# Patient Record
Sex: MALE | Race: WHITE | Employment: FULL TIME | URBAN - METROPOLITAN AREA
[De-identification: names, ages, dates, MRNs, and addresses within clinical notes are randomized per-mention and may not be internally consistent; named-entity substitution may affect disease eponyms.]

---

## 2017-01-17 RX ORDER — PRAVASTATIN SODIUM 20 MG/1
TABLET ORAL
Qty: 30 TAB | Refills: 0 | Status: SHIPPED | OUTPATIENT
Start: 2017-01-17 | End: 2018-02-26 | Stop reason: SDUPTHER

## 2017-01-17 RX ORDER — MOMETASONE FUROATE 1 MG/G
CREAM TOPICAL
Qty: 30 G | Refills: 9 | Status: SHIPPED | OUTPATIENT
Start: 2017-01-17 | End: 2018-02-26 | Stop reason: SDUPTHER

## 2018-02-02 RX ORDER — MOMETASONE FUROATE 1 MG/G
CREAM TOPICAL
Refills: 8 | OUTPATIENT
Start: 2018-02-02

## 2018-02-26 ENCOUNTER — OFFICE VISIT (OUTPATIENT)
Dept: FAMILY MEDICINE CLINIC | Age: 53
End: 2018-02-26

## 2018-02-26 VITALS
HEIGHT: 66 IN | DIASTOLIC BLOOD PRESSURE: 75 MMHG | RESPIRATION RATE: 18 BRPM | SYSTOLIC BLOOD PRESSURE: 119 MMHG | BODY MASS INDEX: 30.02 KG/M2 | TEMPERATURE: 99 F | WEIGHT: 186.8 LBS | HEART RATE: 99 BPM | OXYGEN SATURATION: 96 %

## 2018-02-26 DIAGNOSIS — E78.5 HYPERLIPIDEMIA LDL GOAL <130: ICD-10-CM

## 2018-02-26 DIAGNOSIS — L30.9 ECZEMA OF FACE: Primary | ICD-10-CM

## 2018-02-26 DIAGNOSIS — Z11.59 NEED FOR HEPATITIS C SCREENING TEST: ICD-10-CM

## 2018-02-26 DIAGNOSIS — E55.9 VITAMIN D INSUFFICIENCY: ICD-10-CM

## 2018-02-26 RX ORDER — MOMETASONE FUROATE 1 MG/G
CREAM TOPICAL
Qty: 30 G | Refills: 9 | Status: SHIPPED | OUTPATIENT
Start: 2018-02-26 | End: 2019-04-04 | Stop reason: SDUPTHER

## 2018-02-26 NOTE — PROGRESS NOTES
HISTORY OF PRESENT ILLNESS  HPI  Katelyn Espinosa is a 46 y.o. male with history of vitamin D deficiency and hyperlipidemia who presents to office today for non-fasting follow-up. He denies unusual SOB, chest pain, and any recent ER visits or hospitalizations. Pt requests a refill of his Elocon cream, which he states he uses daily for eczema, mainly for patches on his left arm and face. Pt states he has stopped taking pravastatin, as he doesn't believe it's affected his cholesterol numbers that much. He denies any problems with the pravastatin. He notes a family history of hyperlipidemia with his mother. He denies any family history of stent; he notes a family history of heart attack with his paternal grandmother in her 76s. He states his maternal grandmother lived to 80, and his mother is 80; his paternal grandfather  early of liver cirrhosis. Pt notes intermittent painless left eye spasms. Past Medical History:   Diagnosis Date    Eczema of face 2014    Other and unspecified hyperlipidemia 2010    Other and unspecified hyperlipidemia 2010     Past Surgical History:   Procedure Laterality Date    HX COLONOSCOPY  14    normal-repeat in 2 years d/t findings    HX HEMORRHOIDECTOMY  2014    Dr Marianne Bland VASECTOMY  2006     Current Outpatient Prescriptions on File Prior to Visit   Medication Sig Dispense Refill    aspirin delayed-release 81 mg tablet Take 81 mg by mouth daily. No current facility-administered medications on file prior to visit.       No Known Allergies  Family History   Problem Relation Age of Onset    Cancer Mother     Elevated Lipids Mother     Cancer Father     Heart Disease Maternal Grandmother     Stroke Paternal Grandmother      Social History     Social History    Marital status:      Spouse name: N/A    Number of children: N/A    Years of education: N/A     Social History Main Topics    Smoking status: Never Smoker    Smokeless tobacco: Never Used    Alcohol use Yes      Comment: 16    Drug use: No    Sexual activity: Yes     Partners: Female     Other Topics Concern     Service No    Blood Transfusions No    Caffeine Concern Yes     24 oz. mountain dew daily    Occupational Exposure No    Hobby Hazards No    Sleep Concern No    Stress Concern No    Weight Concern No    Special Diet No    Back Care Yes    Exercise Yes    Bike Helmet No    Seat Belt Yes    Self-Exams Yes     Social History Narrative               Review of Systems   Constitutional: Negative for chills, diaphoresis, fever, malaise/fatigue and weight loss. Eyes: Negative for blurred vision, double vision, pain and redness. Respiratory: Negative for cough, shortness of breath and wheezing. Cardiovascular: Negative for chest pain, palpitations, orthopnea, claudication, leg swelling and PND. Skin: Negative for itching and rash.        scattered rough patches of erythema   Neurological: Negative for dizziness, tingling, tremors, sensory change, speech change, focal weakness, seizures, loss of consciousness, weakness and headaches. Results for orders placed or performed in visit on 84/02/62   METABOLIC PANEL, COMPREHENSIVE   Result Value Ref Range    Glucose 102 (H) 65 - 99 mg/dL    BUN 18 6 - 24 mg/dL    Creatinine 1.16 0.76 - 1.27 mg/dL    GFR est non-AA 74 >59 mL/min/1.73    GFR est AA 85 >59 mL/min/1.73    BUN/Creatinine ratio 16 9 - 20    Sodium 140 134 - 144 mmol/L    Potassium 4.0 3.5 - 5.2 mmol/L    Chloride 99 97 - 108 mmol/L    CO2 23 18 - 29 mmol/L    Calcium 9.5 8.7 - 10.2 mg/dL    Protein, total 6.6 6.0 - 8.5 g/dL    Albumin 4.3 3.5 - 5.5 g/dL    GLOBULIN, TOTAL 2.3 1.5 - 4.5 g/dL    A-G Ratio 1.9 1.1 - 2.5    Bilirubin, total 2.3 (H) 0.0 - 1.2 mg/dL    Alk.  phosphatase 46 39 - 117 IU/L    AST (SGOT) 20 0 - 40 IU/L    ALT (SGPT) 28 0 - 44 IU/L   LIPID PANEL   Result Value Ref Range    Cholesterol, total 205 (H) 100 - 199 mg/dL    Triglyceride 442 (H) 0 - 149 mg/dL    HDL Cholesterol 28 (L) >39 mg/dL    VLDL, calculated Comment 5 - 40 mg/dL    LDL, calculated Comment 0 - 99 mg/dL   CVD REPORT   Result Value Ref Range    INTERPRETATION Note                Physical Exam  Visit Vitals    /75 (BP 1 Location: Left arm, BP Patient Position: Sitting)    Pulse 99    Temp 99 °F (37.2 °C) (Oral)    Resp 18    Ht 5' 6\" (1.676 m)    Wt 186 lb 12.8 oz (84.7 kg)    SpO2 96%    BMI 30.15 kg/m2     Head: Normocephalic, without obvious abnormality, atraumatic  Eyes: conjunctivae/corneas clear. PERRL, EOM's intact. Neck: supple, symmetrical, trachea midline, no adenopathy, thyroid: not enlarged, symmetric, no tenderness/mass/nodules, no carotid bruit and no JVD  Lungs: clear to auscultation bilaterally  Heart: regular rate and rhythm, S1, S2 normal, no murmur, click, rub or gallop  Extremities: extremities normal, atraumatic, no cyanosis or edema  Pulses: 2+ and symmetric  Lymph nodes: Cervical, supraclavicular, and axillary nodes normal.  Neurologic: Grossly normal  Skin: he has scattered patches of erythematous skin that are mostly rough like fine sandpaper measuring anywhere from 1 cm to a couple cm, the ones that bother him the most are on either side of the face in the temple area, the area below his eyes especially the right eye, and on his left forearm he has about a 2 cm patch of skin that's erythematous            ASSESSMENT and PLAN    ICD-10-CM ICD-9-CM    1. Eczema of face L30.9 692.9 mometasone (ELOCON) 0.1 % topical cream   2. Vitamin D insufficiency-26 Jan 2013 E55.9 268.9 VITAMIN D, 25 HYDROXY   3. Hyperlipidemia LDL goal <130 F02.4 430.9 METABOLIC PANEL, COMPREHENSIVE      LIPID PANEL   4. Need for hepatitis C screening test Z11.59 V73.89 HEPATITIS C AB     Diagnoses and all orders for this visit:    1.  Eczema of face  -     mometasone (ELOCON) 0.1 % topical cream; APPLY TO AFFECTED AREA DAILY    2. Vitamin D insufficiency-26 Jan 2013  -     VITAMIN D, 25 HYDROXY    3. Hyperlipidemia LDL goal <660  -     METABOLIC PANEL, COMPREHENSIVE  -     LIPID PANEL    4. Need for hepatitis C screening test  -     HEPATITIS C AB      Follow-up Disposition:  Return in about 6 months (around 8/26/2018), or exertional SOB or CP, for F/U cholesterol, f/u Vitamin D deficiency, obesity. lab results and schedule of future lab studies reviewed with patient  reviewed diet, exercise and weight control  cardiovascular risk and specific lipid/LDL goals reviewed  reviewed medications and side effects in detail  Please call my office if there are any questions- [phone]. I will arrange for follow up after the lab tests done from today return  Recommended a weekly \"heart check. \" I went into detail how to do this. Call for refills on medications as needed. Discussed expected course/resolution/complications of diagnosis in detail with patient. Medication risks/benefits/costs/interactions/alternatives discussed with patient. Pt was given an after visit summary which includes diagnoses, current medications & vitals. Pt expressed understanding with the diagnosis and plan. Total 25 minutes,60 % counseling re: Reviewed symptoms, or lack thereof, of hypertension and elevated cholesterol. For a good Vit D level; recheck yearly and continue same Vit D intake lifelong. BMI is significantly elevated- in the obese range. I reviewed diet, exercise and weight control. Discussed weight control in detail, the importance of mainly decreased carbs, and for weight maintenance, exercise; discussed different diets and that it isn't as important to watch the type of foods as it is to decrease calorie intake no matter what type of diet you do, etc.      He'll come back another day and do his lab work. I reviewed his lab work that he's had done in the past, and I encouraged him to come in and do the fasting lab work to recheck his cholesterol.  He's also due a physical and prostate exam as well, so he should set that up in the next 6 months. I talked about the cardiac risk calculation, and we'll put his numbers in the formula and let him know his risk; most likely he won't need medication. Also, discussed symptoms of concern that were noted today in the note above, treatment options( including doing nothing), when to follow up before recommended time frame. Also, answered all questions. This document was written by Kingsley Paulino, as dictated by Den Rehman MD.  I have reviewed and agree with the above note and have made corrections where appropriate Erasmo Longoria M.D.

## 2018-02-26 NOTE — MR AVS SNAPSHOT
[address] [facility] [address] 
 
 
 [address] [place] [address] 
[phone] Patient: Naomi Baptiste MRN: [id number] STB:[birth year] Visit Information Date & Time Provider Department Dept. Phone Encounter #  
 2/26/2018  4:00 PM Angelica Nunez  [facility] [phone] [id number] Your Appointments 8/27/2018  4:00 PM  
ROUTINE CARE with Angelica Nunez MD  
[facility]) Appt Note: 6 month follow up  
 [address] [address]  
[phone]  
  
   
 [address] [address] Upcoming Health Maintenance Date Due Hepatitis C Screening 1965 DTaP/Tdap/Td series (1 - Tdap) 4/25/1986 COLONOSCOPY 6/13/2026 Allergies as of 2/26/2018  Review Complete On: 2/26/2018 By: Syed Carson LPN No Known Allergies Current Immunizations  Never Reviewed Name Date Hepatitis A Vaccine 6/15/2004 Influenza Vaccine 10/1/2013  
 dT Vaccine 6/15/2004 Not reviewed this visit You Were Diagnosed With   
  
 Codes Comments Vitamin D insufficiency    -  Primary ICD-10-CM: E55.9 ICD-9-CM: 268.9 Hyperlipidemia LDL goal <130     ICD-10-CM: E78.5 ICD-9-CM: 272.4 Need for hepatitis C screening test     ICD-10-CM: Z11.59 
ICD-9-CM: V73.89 Vitals BP Pulse Temp Resp Height(growth percentile) Weight(growth percentile) 119/75 (BP 1 Location: Left arm, BP Patient Position: Sitting) 99 99 °F (37.2 °C) (Oral) 18 5' 6\" (1.676 m) 186 lb 12.8 oz (84.7 kg) SpO2 BMI Smoking Status 96% 30.15 kg/m2 Never Smoker Vitals History BMI and BSA Data Body Mass Index Body Surface Area  
 30.15 kg/m 2 1.99 m 2 Preferred Pharmacy Pharmacy Name Phone [facility] [address] [facility] [phone] Your Updated Medication List  
  
   
 This list is accurate as of 2/26/18  5:12 PM.  Always use your most recent med list.  
  
  
  
  
 aspirin delayed-release 81 mg tablet Take 81 mg by mouth daily. mometasone 0.1 % topical cream  
Commonly known as:  ELOCON  
APPLY TO AFFECTED AREA DAILY pravastatin 20 mg tablet Commonly known as:  PRAVACHOL Take 1 Tab by mouth daily. Prescriptions Sent to Pharmacy Refills  
 mometasone (ELOCON) 0.1 % topical cream 9 Sig: APPLY TO AFFECTED AREA DAILY Class: Normal  
 Pharmacy: [facility] [address] [facility] #: [phone] We Performed the Following HEPATITIS C AB [33852 CPT(R)] LIPID PANEL [09167 CPT(R)] METABOLIC PANEL, COMPREHENSIVE [28141 CPT(R)] VITAMIN D, 25 HYDROXY [id number] CPT(R)] Introducing [facility]! Dear Negar Medina: 
Thank you for requesting a CLIPPATE account. Our records indicate that you already have an active CLIPPATE account. You can access your account anytime at [email or web address] Did you know that you can access your hospital and ER discharge instructions at any time in CLIPPATE? You can also review all of your test results from your hospital stay or ER visit. Additional Information If you have questions, please visit the Frequently Asked Questions section of the CLIPPATE website at [email or web address] Remember, CLIPPATE is NOT to be used for urgent needs. For medical emergencies, dial 911. Now available from your iPhone and Android! Please provide this summary of care documentation to your next provider. Your primary care clinician is listed as [address] [address], [facility] . If you have any questions after today's visit, please call [phone].

## 2018-02-26 NOTE — PROGRESS NOTES
Chief Complaint   Patient presents with    Cholesterol Problem    Vitamin D Deficiency     1. Have you been to the ER, urgent care clinic since your last visit? Hospitalized since your last visit? No    2. Have you seen or consulted any other health care providers outside of the [address] [place] [address] since your last visit? Include any pap smears or colon screening.  No    Patient states that he had a tdap within the past 10 years

## 2018-02-27 PROBLEM — E78.5 HYPERLIPIDEMIA LDL GOAL <130: Status: ACTIVE | Noted: 2018-02-27

## 2018-03-02 LAB
25(OH)D3+25(OH)D2 SERPL-MCNC: 21.5 NG/ML (ref 30–100)
ALBUMIN SERPL-MCNC: 4.5 G/DL (ref 3.5–5.5)
ALBUMIN/GLOB SERPL: 1.7 {RATIO} (ref 1.2–2.2)
ALP SERPL-CCNC: 54 IU/L (ref 39–117)
ALT SERPL-CCNC: 23 IU/L (ref 0–44)
AST SERPL-CCNC: 17 IU/L (ref 0–40)
BILIRUB SERPL-MCNC: 1.7 MG/DL (ref 0–1.2)
BUN SERPL-MCNC: 22 MG/DL (ref 6–24)
BUN/CREAT SERPL: 18 (ref 9–20)
CALCIUM SERPL-MCNC: 9.1 MG/DL (ref 8.7–10.2)
CHLORIDE SERPL-SCNC: 103 MMOL/L (ref 96–106)
CHOLEST SERPL-MCNC: 224 MG/DL (ref 100–199)
CO2 SERPL-SCNC: 22 MMOL/L (ref 18–29)
CREAT SERPL-MCNC: 1.2 MG/DL (ref 0.76–1.27)
GFR SERPLBLD CREATININE-BSD FMLA CKD-EPI: 69 ML/MIN/1.73
GFR SERPLBLD CREATININE-BSD FMLA CKD-EPI: 80 ML/MIN/1.73
GLOBULIN SER CALC-MCNC: 2.6 G/DL (ref 1.5–4.5)
GLUCOSE SERPL-MCNC: 112 MG/DL (ref 65–99)
HCV AB S/CO SERPL IA: <0.1 S/CO RATIO (ref 0–0.9)
HDLC SERPL-MCNC: 28 MG/DL
INTERPRETATION, 910389: NORMAL
LDLC SERPL CALC-MCNC: 121 MG/DL (ref 0–99)
POTASSIUM SERPL-SCNC: 4.5 MMOL/L (ref 3.5–5.2)
PROT SERPL-MCNC: 7.1 G/DL (ref 6–8.5)
SODIUM SERPL-SCNC: 141 MMOL/L (ref 134–144)
TRIGL SERPL-MCNC: 377 MG/DL (ref 0–149)
VLDLC SERPL CALC-MCNC: 75 MG/DL (ref 5–40)

## 2018-08-27 ENCOUNTER — OFFICE VISIT (OUTPATIENT)
Dept: FAMILY MEDICINE CLINIC | Age: 53
End: 2018-08-27

## 2018-08-27 VITALS
WEIGHT: 178 LBS | SYSTOLIC BLOOD PRESSURE: 100 MMHG | OXYGEN SATURATION: 98 % | DIASTOLIC BLOOD PRESSURE: 70 MMHG | HEART RATE: 68 BPM | BODY MASS INDEX: 28.61 KG/M2 | TEMPERATURE: 97.2 F | RESPIRATION RATE: 16 BRPM | HEIGHT: 66 IN

## 2018-08-27 DIAGNOSIS — E55.9 VITAMIN D INSUFFICIENCY: ICD-10-CM

## 2018-08-27 DIAGNOSIS — I10 HYPERTENSION GOAL BP (BLOOD PRESSURE) < 130/80: ICD-10-CM

## 2018-08-27 DIAGNOSIS — I21.02 ST ELEVATION MYOCARDIAL INFARCTION INVOLVING LEFT ANTERIOR DESCENDING (LAD) CORONARY ARTERY (HCC): ICD-10-CM

## 2018-08-27 DIAGNOSIS — E78.5 HYPERLIPIDEMIA LDL GOAL <130: Primary | ICD-10-CM

## 2018-08-27 PROBLEM — I21.9 HEART ATTACK (HCC): Status: ACTIVE | Noted: 2018-08-27

## 2018-08-27 RX ORDER — CARVEDILOL 3.12 MG/1
TABLET ORAL 2 TIMES DAILY WITH MEALS
COMMUNITY

## 2018-08-27 RX ORDER — LISINOPRIL 2.5 MG/1
TABLET ORAL 2 TIMES DAILY
COMMUNITY

## 2018-08-27 RX ORDER — ATORVASTATIN CALCIUM 20 MG/1
TABLET, FILM COATED ORAL DAILY
COMMUNITY

## 2018-08-27 RX ORDER — PRASUGREL 10 MG/1
TABLET, FILM COATED ORAL DAILY
COMMUNITY

## 2018-08-27 NOTE — PROGRESS NOTES
HISTORY OF PRESENT ILLNESS  HPI  Owen Franks is a 48 y.o. Male with a history of MI, eczema, vitamin D deficiency and hyperlipidemia with LDL goal <130, who presents at the office today for a follow up. Pt went to [facility] on 4/21 for MI, where she was seen by Dr. Audra Oro, a cardiologist. Pt had blockage of the LAD and required a stent. Pt states that he felt discomfort of his chest and thought that it might have been due to spoiled food, but notes that he had no improvement from BM or vomiting. He was seen at [facility] and states that since surgery he had not had any issues. Pt's LDL is 36, HDL is 32 and his total cholesterol level is 101. Pt's triglyceride level is 163. Pt has been told by Dr. Audra Oro that his heart is almost entirely back to normal with no long term damage, and he has not been restricted from any activity. Past Medical History:   Diagnosis Date    CAD (coronary artery disease)     MI on 4/21/18    Eczema of face 2/21/2014    Other and unspecified hyperlipidemia 2/19/2010    Other and unspecified hyperlipidemia 2/19/2010     Past Surgical History:   Procedure Laterality Date    HX COLONOSCOPY  5/8/14    normal-repeat in 2 years d/t findings    HX HEMORRHOIDECTOMY  4/2014    Dr Gorge Carbajal VASECTOMY  2006     Current Outpatient Prescriptions on File Prior to Visit   Medication Sig Dispense Refill    mometasone (ELOCON) 0.1 % topical cream APPLY TO AFFECTED AREA DAILY 30 g 9    aspirin delayed-release 81 mg tablet Take 81 mg by mouth daily. No current facility-administered medications on file prior to visit.       No Known Allergies  Family History   Problem Relation Age of Onset    Cancer Mother     Elevated Lipids Mother     Cancer Father 76     Liver    Heart Disease Maternal Grandmother 80    Suicide Maternal Grandfather 80    Stroke Paternal Grandmother 79    Liver Disease Paternal Grandfather     Alcohol abuse Paternal Grandfather 29     Social History Social History    Marital status:      Spouse name: N/A    Number of children: N/A    Years of education: N/A     Social History Main Topics    Smoking status: Never Smoker    Smokeless tobacco: Never Used    Alcohol use Yes      Comment: 16    Drug use: No    Sexual activity: Yes     Partners: Female     Other Topics Concern     Service No    Blood Transfusions No    Caffeine Concern Yes     24 oz. mountain dew daily    Occupational Exposure No    Hobby Hazards No    Sleep Concern No    Stress Concern No    Weight Concern No    Special Diet No    Back Care Yes    Exercise Yes    Bike Helmet No    Seat Belt Yes    Self-Exams Yes     Social History Narrative             Review of Systems   Constitutional: Negative for chills, diaphoresis, fever, malaise/fatigue and weight loss. Eyes: Negative for blurred vision, double vision, pain and redness. Respiratory: Negative for cough, shortness of breath and wheezing. Cardiovascular: Negative for chest pain, palpitations, orthopnea, claudication, leg swelling and PND. Skin: Negative for itching and rash. Neurological: Negative for dizziness, tingling, tremors, sensory change, speech change, focal weakness, seizures, loss of consciousness, weakness and headaches. Results for orders placed or performed in visit on 33/81/89   METABOLIC PANEL, COMPREHENSIVE   Result Value Ref Range    Glucose 112 (H) 65 - 99 mg/dL    BUN 22 6 - 24 mg/dL    Creatinine 1.20 0.76 - 1.27 mg/dL    GFR est non-AA 69 >59 mL/min/1.73    GFR est AA 80 >59 mL/min/1.73    BUN/Creatinine ratio 18 9 - 20    Sodium 141 134 - 144 mmol/L    Potassium 4.5 3.5 - 5.2 mmol/L    Chloride 103 96 - 106 mmol/L    CO2 22 18 - 29 mmol/L    Calcium 9.1 8.7 - 10.2 mg/dL    Protein, total 7.1 6.0 - 8.5 g/dL    Albumin 4.5 3.5 - 5.5 g/dL    GLOBULIN, TOTAL 2.6 1.5 - 4.5 g/dL    A-G Ratio 1.7 1.2 - 2.2    Bilirubin, total 1.7 (H) 0.0 - 1.2 mg/dL    Alk.  phosphatase 54 39 - 117 IU/L    AST (SGOT) 17 0 - 40 IU/L    ALT (SGPT) 23 0 - 44 IU/L   VITAMIN D, 25 HYDROXY   Result Value Ref Range    VITAMIN D, 25-HYDROXY 21.5 (L) 30.0 - 100.0 ng/mL   LIPID PANEL   Result Value Ref Range    Cholesterol, total 224 (H) 100 - 199 mg/dL    Triglyceride 377 (H) 0 - 149 mg/dL    HDL Cholesterol 28 (L) >39 mg/dL    VLDL, calculated 75 (H) 5 - 40 mg/dL    LDL, calculated 121 (H) 0 - 99 mg/dL   HEPATITIS C AB   Result Value Ref Range    Hep C Virus Ab <0.1 0.0 - 0.9 s/co ratio   CVD REPORT   Result Value Ref Range    INTERPRETATION Note          Physical Exam  Visit Vitals    /70 (BP 1 Location: Left arm, BP Patient Position: Sitting)    Pulse 68    Temp 97.2 °F (36.2 °C) (Oral)    Resp 16    Ht 5' 6\" (1.676 m)    Wt 178 lb (80.7 kg)    SpO2 98%    BMI 28.73 kg/m2     Head: Normocephalic, without obvious abnormality, atraumatic  Eyes: conjunctivae/corneas clear. PERRL, EOM's intact. Neck: supple, symmetrical, trachea midline, no adenopathy, thyroid: not enlarged, symmetric, no tenderness/mass/nodules, no carotid bruit and no JVD  Lungs: clear to auscultation bilaterally  Heart: regular rate and rhythm, S1, S2 normal, no murmur, click, rub or gallop  Extremities: extremities normal, atraumatic, no cyanosis or edema  Pulses: 2+ and symmetric  Lymph nodes: Cervical, supraclavicular, and axillary nodes normal.  Neurologic: Grossly normal      ASSESSMENT and PLAN    ICD-10-CM ICD-9-CM    1. Hyperlipidemia LDL goal <130 E78.5 272.4    2. Vitamin D insufficiency-26 Jan 2013 E55.9 268.9 VITAMIN D, 25 HYDROXY   3. Hypertension goal BP (blood pressure) < 130/80 L42 379.8 METABOLIC PANEL, COMPREHENSIVE     Diagnoses and all orders for this visit:    1. Hyperlipidemia LDL goal <130    2. Vitamin D insufficiency-26 Jan 2013  -     VITAMIN D, 25 HYDROXY    3.  Hypertension goal BP (blood pressure) < 977/67  -     METABOLIC PANEL, COMPREHENSIVE      Follow-up Disposition:  Return in about 6 months (around 2/27/2019), or IF BP OOC, for F/U HTN and CHOL. lab results and schedule of future lab studies reviewed with patient  reviewed diet, exercise and weight control  cardiovascular risk and specific lipid/LDL goals reviewed  reviewed medications and side effects in detail  Please call my office if there are any questions- [phone]. I will arrange for follow up after the lab tests done from today return  Recommended a weekly \"heart check. \" I went into detail how to do this. Call for refills on medications as needed. Discussed expected course/resolution/complications of diagnosis in detail with patient. Medication risks/benefits/costs/interactions/alternatives discussed with patient. Pt was given an after visit summary which includes diagnoses, current medications & vitals. Pt expressed understanding with the diagnosis and plan. .  Total 25 minutes,60 % counseling re: Reviewed symptoms, or lack thereof, of hypertension and elevated cholesterol. Reviewed in detail the proper technique of checking the blood pressure- check it on an average day only, not on a stressful day, sitting, no exercise for at least 1 hour and not experiencing any new pain( chronic pain is OK). Patient encouraged to check BP sitting and standing at least once a month and to report these readings to me if > 130/ 80 on average , or if the standing BP is >  15 points lower than the sitting. BMI is significantly elevated- in the overweight range. I reviewed diet, exercise and weight control. Discussed weight control in detail, the importance of mainly decreased carbs, and for weight maintenance, exercise; discussed different diets and that it isn't as important to watch the type of foods as it is to decrease calorie intake no matter what type of diet you do, etc.      Decided to eventually increase his Lipitor to 80 mg, but for now will increase to 40 mg, with him using 2 of the 20 mg he has at home.  Because he currently has no physical activity restrictions from his cardiologist, I suggested that he perform a weekly \"heart check\". Since his BP has always been on the low side, I recommended that he get a cuff to check it sitting and standing. If it drops more than 15 points upon standing he should let me know. Suggested that he check it monthly in the long term, and more often when medications are changed. Also, discussed symptoms of concern that were noted today in the note above, treatment options( including doing nothing), when to follow up before recommended time frame. Also, answered all questions. This document was written by Kyler Moore, as dictated by Polly Billingsley MD..  I have reviewed and agree with the above note and have made corrections where appropriate Erasmo Austin M.D.

## 2018-08-27 NOTE — MR AVS SNAPSHOT
Reyna Samayoa 
 
 
 222 [facility] [address] [place] [address] 
[phone] Patient: Tory Kendrick MRN: [id number] NJT:[id number]/40/3135 Visit Information Date & Time Provider Department Dept. Phone Encounter #  
 8/27/2018  4:00 PM Melba Romeo  [place] [phone] [id number] Upcoming Health Maintenance Date Due DTaP/Tdap/Td series (1 - Tdap) 4/25/1986 Influenza Age 5 to Adult 8/1/2018 COLONOSCOPY 6/13/2026 Allergies as of 8/27/2018  Review Complete On: 8/27/2018 By: Ivette Tineo LPN No Known Allergies Current Immunizations  Never Reviewed Name Date Hepatitis A Vaccine 6/15/2004 Influenza Vaccine 10/1/2013  
 dT Vaccine 6/15/2004 Not reviewed this visit You Were Diagnosed With   
  
 Codes Comments Hyperlipidemia LDL goal <130    -  Primary ICD-10-CM: E78.5 ICD-9-CM: 272.4 Vitamin D insufficiency     ICD-10-CM: E55.9 ICD-9-CM: 268.9 Hypertension goal BP (blood pressure) < 130/80     ICD-10-CM: I10 
ICD-9-CM: 401.9 Vitals BP Pulse Temp Resp Height(growth percentile) Weight(growth percentile) 100/70 (BP 1 Location: Left arm, BP Patient Position: Sitting) 68 97.2 °F (36.2 °C) (Oral) 16 5' 6\" (1.676 m) 178 lb (80.7 kg) SpO2 BMI Smoking Status 98% 28.73 kg/m2 Never Smoker Vitals History BMI and BSA Data Body Mass Index Body Surface Area 28.73 kg/m 2 1.94 m 2 Preferred Pharmacy Pharmacy Name Phone Sonja [facility] [address], [address] [phone] Your Updated Medication List  
  
   
This list is accurate as of 8/27/18  5:20 PM.  Always use your most recent med list.  
  
  
  
  
 aspirin delayed-release 81 mg tablet Take 81 mg by mouth daily. carvedilol 3.125 mg tablet Commonly known as:  Medeiros Servant Take  by mouth two (2) times daily (with meals). EFFIENT 10 mg tablet Generic drug:  prasugrel Take  by mouth daily. LIPITOR 20 mg tablet Generic drug:  atorvastatin Take  by mouth daily. lisinopril 2.5 mg tablet Commonly known as:  Fabiola Yoshi Take  by mouth two (2) times a day. Indications: Myocardial Infarction  
  
 mometasone 0.1 % topical cream  
Commonly known as:  ELOCON  
APPLY TO AFFECTED AREA DAILY Introducing \[facility] [place] [facility]\"" & HEALTH SERVICES! Dear Sully Schuler: 
Thank you for requesting a Socialance account. Our records indicate that you already have an active Socialance account. You can access your account anytime at [email or web address] Class Did you know that you can access your hospital and ER discharge instructions at any time in Socialance? You can also review all of your test results from your hospital stay or ER visit. Additional Information If you have questions, please visit the Frequently Asked Questions section of the Socialance website at [email or web address] Remember, Socialance is NOT to be used for urgent needs. For medical emergencies, dial 911. Now available from your iPhone and Android! Please provide this summary of care documentation to your next provider. Your primary care clinician is listed as [place] [address], [facility]/s . If you have any questions after today's visit, please call [phone].

## 2018-08-27 NOTE — PROGRESS NOTES
Chief Complaint   Patient presents with    Cholesterol Problem     follow up    Vitamin D Deficiency     follow up    Heart Problem     pt states he had an MI on 4/21/18.  admitted to [facility] EM[facility]Y [facility]. Dr. Bayron Leblanc is Cardiologist.     Mehnaz Figures FOR VISIT AND HAVE OBTAINED THE NECESSARY DOCUMENTATION\"  1. Have you been to the ER, urgent care clinic since your last visit? Hospitalized since your last visit? Yes Where: see above    2. Have you seen or consulted any other health care providers outside of the [address] [place] [address] since your last visit? Include any pap smears or colon screening.  Yes Where: same

## 2018-08-28 PROBLEM — I21.9 HEART ATTACK (HCC): Status: ACTIVE | Noted: 2018-04-21

## 2019-04-04 DIAGNOSIS — L30.9 ECZEMA OF FACE: ICD-10-CM

## 2019-04-04 RX ORDER — MOMETASONE FUROATE 1 MG/G
CREAM TOPICAL
Qty: 30 G | Refills: 0 | Status: SHIPPED | OUTPATIENT
Start: 2019-04-04 | End: 2019-12-20

## 2019-12-20 DIAGNOSIS — L30.9 ECZEMA OF FACE: ICD-10-CM

## 2019-12-20 RX ORDER — MOMETASONE FUROATE 1 MG/G
CREAM TOPICAL
Qty: 30 G | Refills: 0 | Status: SHIPPED | OUTPATIENT
Start: 2019-12-20

## 2019-12-20 NOTE — TELEPHONE ENCOUNTER
PCP: Kamini Culp MD    Last appt: 8/27/2018  No future appointments.     Requested Prescriptions     Pending Prescriptions Disp Refills    mometasone (ELOCON) 0.1 % topical cream [Pharmacy Med Name: Mometasone Furoate 0.1 % External Cream] 30 g 0     Sig: APPLY CREAM TOPICALLY TO ECZEMA ON FACE ONCE DAILY

## 2022-03-19 PROBLEM — E78.5 HYPERLIPIDEMIA LDL GOAL <130: Status: ACTIVE | Noted: 2018-02-27

## 2022-03-20 PROBLEM — I21.9 HEART ATTACK (HCC): Status: ACTIVE | Noted: 2018-04-21

## 2022-11-07 ENCOUNTER — OFFICE VISIT (OUTPATIENT)
Dept: INTERNAL MEDICINE CLINIC | Age: 57
End: 2022-11-07
Payer: COMMERCIAL

## 2022-11-07 VITALS
SYSTOLIC BLOOD PRESSURE: 92 MMHG | DIASTOLIC BLOOD PRESSURE: 70 MMHG | OXYGEN SATURATION: 96 % | RESPIRATION RATE: 16 BRPM | HEIGHT: 67 IN | BODY MASS INDEX: 28.97 KG/M2 | WEIGHT: 184.6 LBS | TEMPERATURE: 97.6 F | HEART RATE: 72 BPM

## 2022-11-07 DIAGNOSIS — E78.00 PURE HYPERCHOLESTEROLEMIA: ICD-10-CM

## 2022-11-07 DIAGNOSIS — G89.29 CHRONIC RIGHT SHOULDER PAIN: Primary | ICD-10-CM

## 2022-11-07 DIAGNOSIS — S76.311S STRAIN OF RIGHT HAMSTRING, SEQUELA: ICD-10-CM

## 2022-11-07 DIAGNOSIS — I25.10 CORONARY ARTERY DISEASE INVOLVING NATIVE CORONARY ARTERY OF NATIVE HEART WITHOUT ANGINA PECTORIS: ICD-10-CM

## 2022-11-07 DIAGNOSIS — M25.511 CHRONIC RIGHT SHOULDER PAIN: Primary | ICD-10-CM

## 2022-11-07 PROBLEM — I25.2 HISTORY OF MI (MYOCARDIAL INFARCTION): Status: ACTIVE | Noted: 2018-04-21

## 2022-11-07 PROCEDURE — 99203 OFFICE O/P NEW LOW 30 MIN: CPT | Performed by: INTERNAL MEDICINE

## 2022-11-07 NOTE — PROGRESS NOTES
11/7/2022    Jason Diss 1965 is a 62y.o. year old male established patient,   here for evaluation of the following chief complaint(s):  Chief Complaint   Patient presents with    Shoulder Pain           ASSESSMENT/PLAN:  Below is the assessment and plan developed based on review of pertinent history, physical exam, labs, studies, and medications. 1. Chronic right shoulder pain  -     REFERRAL TO PHYSICAL THERAPY  2. Coronary artery disease involving native coronary artery of native heart without angina pectoris  Assessment & Plan:  4/2018 s/p stent  Cardiology Dr Marlo Manzanares  3. Pure hypercholesterolemia  Assessment & Plan:   LDl 37 on last labs 4/2022  4. Strain of right hamstring, sequela  -     REFERRAL TO PHYSICAL THERAPY     Suspect right rotator cuff tendinopathy and hamstring strain, no red flags for nerve impingements  Will recommend trial of physical therapy- let me know if not improved and would send for ortho consult      Follow-up and Dispositions    Return in about 4 months (around 3/7/2023) for annual physical, or sooner as needed. SUBJECTIVE/OBJECTIVE:  New patient to me, here to establish care    Has been bothered by right shoulder pain for months, worse in past few weeks in that he has trouble with sleeping due to the achey, throbbing pain. Pain is anterior and deeper into the joint. Overhead activities like when he was playing softball throwing did bother it. Denies numbness or tingling down the arm. Rarely will take ibuprofen, not regularly, minimal relief. Not exercising regularly    Also some lingering hamstring issues on the right leg, posterior leg ache, no sudden injury. This has also been for several months. Thought it was getting better but then in recent weeks it worsened again, if he walks longer distances it will flare up again. Review of Systems   Constitutional:  Negative for chills and fever. Respiratory:  Negative for cough and shortness of breath. Cardiovascular:  Negative for chest pain, palpitations and leg swelling. Gastrointestinal:  Negative for abdominal pain. Musculoskeletal:  Positive for joint pain (R shoulder) and myalgias (R thigh). Skin:  Negative for rash. Psychiatric/Behavioral:  Negative for depression. Physical Exam  Constitutional:       General: He is not in acute distress. Appearance: Normal appearance. He is not ill-appearing. HENT:      Head: Normocephalic and atraumatic. Eyes:      Conjunctiva/sclera: Conjunctivae normal.   Pulmonary:      Effort: Pulmonary effort is normal.   Musculoskeletal:      Right shoulder: No swelling, deformity, tenderness, bony tenderness or crepitus. Normal range of motion (discomfort with abduction). Normal strength. Left shoulder: No bony tenderness or crepitus. Normal strength. Skin:     General: Skin is warm and dry. Neurological:      General: No focal deficit present. Mental Status: He is alert. Gait: Gait is intact. Psychiatric:         Mood and Affect: Mood normal.         Behavior: Behavior normal.         Thought Content: Thought content normal.        Vitals:    11/07/22 1301   BP: 92/70   Pulse: 72   Resp: 16   Temp: 97.6 °F (36.4 °C)   TempSrc: Temporal   SpO2: 96%   Weight: 184 lb 9.6 oz (83.7 kg)   Height: 5' 7\" (1.702 m)        The following sections were reviewed & updated as appropriate: Problem List, Allergies, PMH, PSH, FH, and SH. Patient Active Problem List   Diagnosis Code    Eczema of face L30.9    Pure hypercholesterolemia E78.00    History of MI (myocardial infarction) I25.2    Coronary artery disease involving native coronary artery of native heart without angina pectoris I25.10        Current Outpatient Medications   Medication Sig Dispense Refill    mometasone (ELOCON) 0.1 % topical cream APPLY CREAM TOPICALLY TO ECZEMA ON FACE ONCE DAILY 30 g 0    carvedilol (COREG) 3.125 mg tablet Take  by mouth two (2) times daily (with meals). lisinopril (PRINIVIL, ZESTRIL) 2.5 mg tablet Take  by mouth two (2) times a day. Indications: Myocardial Infarction      atorvastatin (LIPITOR) 20 mg tablet Take  by mouth daily. aspirin delayed-release 81 mg tablet Take 81 mg by mouth daily. Patient has no known allergies. Social History     Occupational History    Not on file   Tobacco Use    Smoking status: Never    Smokeless tobacco: Never   Substance and Sexual Activity    Alcohol use: Yes     Comment: 16    Drug use: No    Sexual activity: Yes     Partners: Female            Disclaimer:  Aspects of this note may have been generated using Dragon voice recognition software. Despite editing, there may be some syntax errors   We discussed the expected course, resolution and complications of the diagnosis(es) in detail. I have discussed any significant medication side effects and warnings with the patient when indicated. I advised them to contact the office if their condition worsens, changes or fails to improve as anticipated. Patient expressed understanding of the diagnosis and plan. An electronic signature was used to authenticate this note.   -- Lennox Mendez MD

## 2022-12-06 ENCOUNTER — HOSPITAL ENCOUNTER (OUTPATIENT)
Dept: PHYSICAL THERAPY | Age: 57
Discharge: HOME | End: 2022-12-06
Payer: COMMERCIAL

## 2022-12-06 PROCEDURE — 97161 PT EVAL LOW COMPLEX 20 MIN: CPT

## 2022-12-06 PROCEDURE — 97016 VASOPNEUMATIC DEVICE THERAPY: CPT

## 2022-12-06 PROCEDURE — 97110 THERAPEUTIC EXERCISES: CPT

## 2022-12-06 NOTE — THERAPY EVALUATION
Physical Therapy at [facility],   a part of [address]  [address] [address], [address] [place] [address], [address]  Phone: [phone]  Fax: [phone]    Plan of Care/Statement of Necessity for Physical Therapy Services  2-15    Patient name: Perfecto Marie  : 1965  Provider#: [id number]  Referral source: Virginia Mendez MD      Medical/Treatment Diagnosis: Chronic right shoulder pain [M25.511, G89.29]  Strain of muscle, fascia and tendon of the posterior muscle group at thigh level, right thigh, sequela [S76.311S]     Prior Hospitalization: see medical history     Comorbidities: Heart attack (2018)  Prior Level of Function:  Plays softball, basketball, golf  Medications: Verified on Patient Summary List    Start of Care: 22      Onset Date: 2022       The Plan of Care and following information is based on the information from the initial evaluation. Assessment/ key information: Pt is a very pleasant and motivated 62year old male who was referred to skilled PT for chronic right shoulder pain and a right hamstring strain. Pt reports primary complaints of  intermittent deep right shoulder pain as well as intermittent pain from buttock to mid-medial/posterior thigh. Based on examination, patient presents with symptoms consistent with R infraspinatus pathology with associated SAIS, as well as a chronic right adductor strain.     Evaluation Complexity History LOW Complexity : Zero comorbidities / personal factors that will impact the outcome / POC; Examination HIGH Complexity : 4+ Standardized tests and measures addressing body structure, function, activity limitation and / or participation in recreation  ;Presentation LOW Complexity : Stable, uncomplicated  ;Clinical Decision Making MEDIUM Complexity : FOTO score of 26-74  Overall Complexity Rating: LOW     Problem List: pain affecting function, decrease ROM, decrease strength, decrease ADL/ functional abilitiies, decrease activity tolerance, and decrease flexibility/ joint mobility   Treatment Plan may include any combination of the following: Therapeutic exercise, Neuromuscular reeducation, Manual therapy, Therapeutic activity, Self care/home management, Electric stim unattended , and Vasopneumatic device  Patient / Family readiness to learn indicated by: asking questions, trying to perform skills, and interest  Persons(s) to be included in education: patient (P)  Barriers to Learning/Limitations: None  Patient Goal (s): No pain  Patient Self Reported Health Status: excellent  Rehabilitation Potential: good    Short and Long Term Goals: To be accomplished in 16 treatments:   1. Pt will be independent and compliant with HEP. 2. Pt will improve FOTO score by the MCID from 60 to >/= 74 demonstrating improved overall function with decreased pain or discomfort. 3. Pt will be able to reach overhead without increased right shoulder pain. 4. Pt will be able to sleep through the night without waking up due to shoulder pain. 5. Pt will be able to throw a softball without increased shoulder pain. 6. Pt will be able to walk >/= 20 minutes without increased right leg pain. 7. Pt will be able to ascend a ladder or stairs in a reciprocal pattern without increased right leg pain. Frequency / Duration: Patient to be seen 1-2 times per week for 16 treatments. Patient/ Caregiver education and instruction: self care, activity modification and exercises    [x]  Plan of care has been reviewed with MITCHELL Vazquez, PT, DPT 12/6/2022     ________________________________________________________________________    I certify that the above Therapy Services are being furnished while the patient is under my care. I agree with the treatment plan and certify that this therapy is necessary.     Physician's Signature:____________________  Date:____________Time: _________      Ricky Sun MD

## 2022-12-06 NOTE — PROGRESS NOTES
PT INITIAL EVALUATION NOTE - [facility] 2-15    Patient Name: Mark Martínez  Date:2022  : 1965  [x]  Patient  Verified  Payor: Jeffery Giles / Plan: Klaudia Soto [id number] PPO / Product Type: PPO /    In time: 2:26 pm  Out time: 3:28 pm  Total Treatment Time (min): 62  Total Timed Codes (min): 10  1:1 Treatment Time ( only): 10   Visit #: 1     Treatment Area: Chronic right shoulder pain [M25.511, G89.29]  Strain of muscle, fascia and tendon of the posterior muscle group at thigh level, right thigh, sequela [S76.311S]    SUBJECTIVE  Pain Level (0-10 scale): 2  Any medication changes, allergies to medications, adverse drug reactions, diagnosis change, or new procedure performed?: [] No    [x] Yes (see summary sheet for update)  Subjective:    Pt was referred to skilled PT for chronic right shoulder pain and a right hamstring strain. Today, Pt reports primary complaints of intermittent deep right shoulder pain. Shoulder pain waking him up at night. Pt denies any numbness/tingling or clicking/popping. No history of prior shoulder pain. No imaging yet. Pt is R handed. Mechanism of Injury: : Insidious onset. Had been playing in a softball league since May. Some soreness prior to receiving COVID booster; more sharp pain since the booster which seems to occur at night or reaching overhead. RLE pain:  intermittent soreness from R buttock to mid-medial/posterior thigh. Never heard a pop. Mild bilateral LBP. Pt denies any numbness/tingling. Symptom onset 2022; insidious onset. Aggravating factors include reaching above shoulder height, at night. Relieving factors include rest.       RLE: Aggravating factors: walking >/= 5 minutes, pushing off to sprint, stepping up a ladder>stairs. Relieving factors: rest    PLOF: Softball, basketball, golf;  Stopped playing softball August.  Previous Treatment/Compliance: None   Work Hx: Consultant   PMHx/Surgical Hx: Heart attack (2018)    Pt Goals: No more pain; understand what is going on     OBJECTIVE  Posture:  Mild genu varus bilaterally  Other Observations:  --  Gait:  No significant abnormalities  Squats: 100 degrees knee flexion, no pain  SLS: R: good balance, mild discomfort    L: Normal     Palpation: TTP mid-proximal right adductor musculature; Mild TTP R infraspinatus    Lumbar AROM: WNL in all directions; mild localized LBP with full extension    R Shoulder ROM:   AROM   PROM   Flexion   152 p! Proximal lateral arm (165) 155 mild p! Extension  --   --   Abduction  145 p!   170 p! Adduction  --   --   IR   Hand to T10 p! (T5) 90   ER   Hand to T3 (T3) 90 mild p! Joint Mobility Assessment: Glenohumeral: Mild AP hypomobility    UPPER QUARTER   MUSCLE STRENGTH  KEY       R  L  0 - No Contraction   Flexion  5  5  1 - Trace    Extension 5  5  2 - Poor    Abduction 5  5  3 - Fair     IR  5  5  4 - Good    ER  4- dull p! 5  5 - Normal       Neurological: Reflexes / Sensations: NT  Special Tests: Neer Impingement: (+)  Daina-Randall: (+)    Briscoe: (+) p! And weakness  Apprehension: (+) mild p! Relocation : (-) increased p! AC Compression: (-)    AC Crossover: (+)    LOWER QUARTER   MUSCLE STRENGTH  KEY       R  L  0 - No Contraction  Knee ext  5  5  1 - Trace            flex  5  5  2 - Poor   Hip ext   5  5  3 - Fair          flex   5  5  4 - Good         abd  4  4  5 - Normal         add  5  5      Ankle DF  5  5                PF  5  5                INV  5  5                EV  5  5    Special Tests: Trendelenberg: (-)                     H.S. SLR: (+) R LBP at 90; L: 90   Piriformis Ext: (-)     Modality rationale: decrease inflammation and decrease pain to improve the patients ability to perform functional activities with decreased pain or discomfort.    Min Type Additional Details    [] Estim: []Att   []Unatt        []TENS instruct                  []IFC  []Premod   []NMES                     []Other:  []w/US   []w/ice []w/heat  Position:  Location:    []  Traction: [] Cervical       []Lumbar                       [] Prone          []Supine                       []Intermittent   []Continuous Lbs:  [] before manual  [] after manual  []w/heat    []  Ultrasound: []Continuous   [] Pulsed at:                           []1MHz   []3MHz Location:  W/cm2:    [] Paraffin         Location:   []w/heat    []  Ice     []  Heat  []  Ice massage Position:  Location:    []  Laser  []  Other: Position:  Location:   10   [x]  Vasopneumatic Device (R shoulder) Pressure:       [] lo [x] med [] hi   Temperature: 34     [x] Skin assessment post-treatment:  [x]intact []redness- no adverse reaction    []redness - adverse reaction:     10 min Therapeutic Exercise:  [] See flow sheet :   Rationale: increase ROM, increase strength, and increase proprioception to improve the patients ability to perform functional activities with decreased pain or discomfort. With   [] TE   [] TA   [] Neuro   [] SC   [] other: Patient Education: [x] Review HEP    [] Progressed/Changed HEP based on:   [] positioning   [] body mechanics   [] transfers   [] heat/ice application    [x] other: Educated Pt regarding impairments, role of PT, and POC.        Other Objective/Functional Measures: FOTO Functional Measure: 60/100                     Pain Level (0-10 scale) post treatment: 2    ASSESSMENT/Changes in Function:     [x]  See Plan of [address] PT, DPT 12/6/2022

## 2022-12-14 ENCOUNTER — HOSPITAL ENCOUNTER (OUTPATIENT)
Dept: PHYSICAL THERAPY | Age: 57
Discharge: HOME | End: 2022-12-14
Payer: COMMERCIAL

## 2022-12-14 PROCEDURE — 97112 NEUROMUSCULAR REEDUCATION: CPT

## 2022-12-14 PROCEDURE — 97016 VASOPNEUMATIC DEVICE THERAPY: CPT

## 2022-12-14 PROCEDURE — 97110 THERAPEUTIC EXERCISES: CPT

## 2022-12-14 NOTE — PROGRESS NOTES
PT DAILY TREATMENT NOTE - [facility] 2-15    Patient Name: Myrtle Cm  Date:2022  : 1965  [x]  Patient  Verified  Payor: BLUE CROSS / Plan: Klaudia Soto [id number] PPO / Product Type: PPO /    In time: 9:18 am  Out time: 10:20 am  Total Treatment Time (min): 62  Total Timed Codes (min): 52  1:1 Treatment Time ( only): 44  Visit #: 2    Treatment Area: Chronic right shoulder pain [M25.511, G89.29]  Strain of muscle, fascia and tendon of the posterior muscle group at thigh level, right thigh, sequela [S76.311S]    SUBJECTIVE  Pain Level (0-10 scale): 1  Any medication changes, allergies to medications, adverse drug reactions, diagnosis change, or new procedure performed?: [x] No    [] Yes (see summary sheet for update)  Subjective functional status/changes:   [] No changes reported  Pt reports feeling about the same. He has been working on his exercises without issue. OBJECTIVE           Modality rationale: decrease inflammation and decrease pain to improve the patients ability to perform functional activities with decreased pain or discomfort.     Min Type Additional Details     [] Estim: []Att   []Unatt        []TENS instruct                  []IFC  []Premod   []NMES                     []Other:  []w/US   []w/ice   []w/heat  Position:  Location:     []  Traction: [] Cervical       []Lumbar                       [] Prone          []Supine                       []Intermittent   []Continuous Lbs:  [] before manual  [] after manual  []w/heat     []  Ultrasound: []Continuous   [] Pulsed at:                           []1MHz   []3MHz Location:  W/cm2:     [] Paraffin         Location:   []w/heat     []  Ice     []  Heat  []  Ice massage Position:  Location:     []  Laser  []  Other: Position:  Location:   10    [x]  Vasopneumatic Device (R shoulder) Pressure:       [] lo [x] med [] hi   Temperature: 34      [x] Skin assessment post-treatment:  [x]intact []redness- no adverse reaction    []redness - adverse reaction:      32 min Therapeutic Exercise:  [] See flow sheet :   Rationale: increase ROM, increase strength, and increase proprioception to improve the patients ability to perform functional activities with decreased pain or discomfort. 20 min Neuromuscular Re-education:  []  See flow sheet : 4-way R shoulder rhythmic stab at 45 degrees ER/flexion; bridge with band for muscle activation; PPT in supine; rows with PPT for trunk stability   Rationale: increase ROM, increase strength, and increase proprioception  to improve the patients ability to perform functional activities with decreased pain or discomfort. With   [] TE   [] TA   [] Neuro   [] SC   [] other: Patient Education: [x] Review HEP    [] Progressed/Changed HEP based on:   [] positioning   [] body mechanics   [] transfers   [] heat/ice application    [] other:      Other Objective/Functional Measures: --     Pain Level (0-10 scale) post treatment: 0    ASSESSMENT/Changes in Function:   Pt noted R buttock and medial leg pain with standing rows; cued for neutral spine/PPT secondary to minimize excessive lumbar lordosis which resolved pain. Proceeded to look at lumbar extension AROM which reproduced R leg pain indicating potential lumbar spine origin of symptoms; prescribed supine PPT for HEP. Patient will continue to benefit from skilled PT services to modify and progress therapeutic interventions, address functional mobility deficits, address ROM deficits, address strength deficits, analyze and address soft tissue restrictions, analyze and cue movement patterns, analyze and modify body mechanics/ergonomics, and assess and modify postural abnormalities to attain remaining goals. [x]  See Plan of Care  []  See progress note/recertification  []  See Discharge Summary         Progress towards goals / Updated goals:  Short and Long Term Goals:  To be accomplished in 16 treatments:               1. Pt will be independent and compliant with HEP. 2. Pt will improve FOTO score by the MCID from 60 to >/= 74 demonstrating improved overall function with decreased pain or discomfort. 3. Pt will be able to reach overhead without increased right shoulder pain. 4. Pt will be able to sleep through the night without waking up due to shoulder pain. 5. Pt will be able to throw a softball without increased shoulder pain. 6. Pt will be able to walk >/= 20 minutes without increased right leg pain. 7. Pt will be able to ascend a ladder or stairs in a reciprocal pattern without increased right leg pain.     PLAN  [x]  Upgrade activities as tolerated     [x]  Continue plan of care  []  Update interventions per flow sheet       []  Discharge due to:_  []  Other:_      Marsha Vazquez, PT, DPT 12/14/2022

## 2022-12-19 ENCOUNTER — HOSPITAL ENCOUNTER (OUTPATIENT)
Dept: PHYSICAL THERAPY | Age: 57
Discharge: HOME | End: 2022-12-19
Payer: COMMERCIAL

## 2022-12-19 PROCEDURE — 97140 MANUAL THERAPY 1/> REGIONS: CPT

## 2022-12-19 PROCEDURE — 97016 VASOPNEUMATIC DEVICE THERAPY: CPT

## 2022-12-19 PROCEDURE — 97110 THERAPEUTIC EXERCISES: CPT

## 2022-12-19 PROCEDURE — 97112 NEUROMUSCULAR REEDUCATION: CPT

## 2022-12-19 NOTE — PROGRESS NOTES
PT DAILY TREATMENT NOTE - [facility] 2-15    Patient Name: Alejandra Sullivan  Date:2022  : 1965  [x]  Patient  Verified  Payor: BLUE CROSS / Plan: Klaudia Soto [id number] PPO / Product Type: PPO /    In time: 3:13 pm  Out time: 4:16 pm  Total Treatment Time (min): 63  Total Timed Codes (min): 53  1:1 Treatment Time ( only): 45  Visit #: 3    Treatment Area: Chronic right shoulder pain [M25.511, G89.29]  Strain of muscle, fascia and tendon of the posterior muscle group at thigh level, right thigh, sequela [S76.311S]    SUBJECTIVE  Pain Level (0-10 scale): 1  Any medication changes, allergies to medications, adverse drug reactions, diagnosis change, or new procedure performed?: [x] No    [] Yes (see summary sheet for update)  Subjective functional status/changes:   [] No changes reported  Pt reports he thinks the pelvic tilts are helping. Still right medial leg pain with prolonged walking or leaning sideways (to the left). R shoulder pain has been a little bit better over the past few nights. Still pain with certain shoulder movements. OBJECTIVE           Modality rationale: decrease inflammation and decrease pain to improve the patients ability to perform functional activities with decreased pain or discomfort.     Min Type Additional Details     [] Estim: []Att   []Unatt        []TENS instruct                  []IFC  []Premod   []NMES                     []Other:  []w/US   []w/ice   []w/heat  Position:  Location:     []  Traction: [] Cervical       []Lumbar                       [] Prone          []Supine                       []Intermittent   []Continuous Lbs:  [] before manual  [] after manual  []w/heat     []  Ultrasound: []Continuous   [] Pulsed at:                           []1MHz   []3MHz Location:  W/cm2:     [] Paraffin         Location:   []w/heat    10 [x]  Ice     []  Heat  []  Ice massage Position: sitting  Location: low back     []  Laser  []  Other: Position:  Location:   10    [x] Vasopneumatic Device (R shoulder) Pressure:       [] lo [x] med [] hi   Temperature: 34      [x] Skin assessment post-treatment:  [x]intact []redness- no adverse reaction    []redness - adverse reaction:      26 min Therapeutic Exercise:  [] See flow sheet :   Rationale: increase ROM, increase strength, and increase proprioception to improve the patients ability to perform functional activities with decreased pain or discomfort. 15 min Neuromuscular Re-education:  []  See flow sheet : 4-way R shoulder rhythmic stab at 45 degrees ER/flexion; bridge with band for muscle activation; PPT in supine; rows with PPT for trunk stability   Rationale: increase ROM, increase strength, and increase proprioception  to improve the patients ability to perform functional activities with decreased pain or discomfort. 12 min Manual Therapy: STM of R teres major and lats; cross-body contract relax; grade III inferior mobs; grade III PA thoracic mobs in prone   Rationale: decrease pain, increase ROM, and increase tissue extensibility to improve the patients ability to reach overhead with decreased pain. With   [] TE   [] TA   [] Neuro   [] SC   [] other: Patient Education: [x] Review HEP    [] Progressed/Changed HEP based on:   [] positioning   [] body mechanics   [] transfers   [] heat/ice application    [] other:      Other Objective/Functional Measures: --     Pain Level (0-10 scale) post treatment: 0    ASSESSMENT/Changes in Function:   Pt responded very well to manual treatment today, specifically STM of R teres major in prone which significantly improved IR PROM. Reviewed technique with side-lying ER with cueing to roll forward and set scapula to better isolate external rotators; Pt felt fatigue, though no pain. R LBP at 70 degrees SLR due to neural tension; prescribed supine sciatic nerve glides to improve neural mobility.  Cued to minimize LE compensation  Patient will continue to benefit from skilled PT services to modify and progress therapeutic interventions, address functional mobility deficits, address ROM deficits, address strength deficits, analyze and address soft tissue restrictions, analyze and cue movement patterns, analyze and modify body mechanics/ergonomics, and assess and modify postural abnormalities to attain remaining goals. [x]  See Plan of Care  []  See progress note/recertification  []  See Discharge Summary         Progress towards goals / Updated goals:  Short and Long Term Goals: To be accomplished in 16 treatments:               1. Pt will be independent and compliant with HEP. 2. Pt will improve FOTO score by the MCID from 60 to >/= 74 demonstrating improved overall function with decreased pain or discomfort. 3. Pt will be able to reach overhead without increased right shoulder pain. 4. Pt will be able to sleep through the night without waking up due to shoulder pain. 5. Pt will be able to throw a softball without increased shoulder pain. 6. Pt will be able to walk >/= 20 minutes without increased right leg pain. 7. Pt will be able to ascend a ladder or stairs in a reciprocal pattern without increased right leg pain.     PLAN  [x]  Upgrade activities as tolerated     [x]  Continue plan of care  []  Update interventions per flow sheet       []  Discharge due to:_  []  Other:_      Mook Hancock, PT, DPT 12/19/2022

## 2022-12-21 ENCOUNTER — HOSPITAL ENCOUNTER (OUTPATIENT)
Dept: PHYSICAL THERAPY | Age: 57
Discharge: HOME | End: 2022-12-21
Payer: COMMERCIAL

## 2022-12-21 PROCEDURE — 97112 NEUROMUSCULAR REEDUCATION: CPT

## 2022-12-21 PROCEDURE — 97110 THERAPEUTIC EXERCISES: CPT

## 2022-12-21 PROCEDURE — 97016 VASOPNEUMATIC DEVICE THERAPY: CPT

## 2022-12-21 PROCEDURE — 97140 MANUAL THERAPY 1/> REGIONS: CPT

## 2022-12-21 NOTE — PROGRESS NOTES
PT DAILY TREATMENT NOTE - [facility] 2-15    Patient Name: Too Larson  Date:2022  : 1965  [x]  Patient  Verified  Payor: BLUE CROSS / Plan: Klaudia Soto [id number] PPO / Product Type: PPO /    In time: 3:11P  Out time: 4:15P  Total Treatment Time (min): 64  Total Timed Codes (min): 54  1:1 Treatment Time ( only): 50  Visit #: 4    Treatment Area: Chronic right shoulder pain [M25.511, G89.29]  Strain of muscle, fascia and tendon of the posterior muscle group at thigh level, right thigh, sequela [S76.311S]    SUBJECTIVE  Pain Level (0-10 scale): 3/10  Any medication changes, allergies to medications, adverse drug reactions, diagnosis change, or new procedure performed?: [x] No    [] Yes (see summary sheet for update)  Subjective functional status/changes:   [] No changes reported  Pt reported shoulder is doing better. Primary c/o pain in back of legs    OBJECTIVE           Modality rationale: decrease inflammation and decrease pain to improve the patients ability to perform functional activities with decreased pain or discomfort.     Min Type Additional Details     [] Estim: []Att   []Unatt        []TENS instruct                  []IFC  []Premod   []NMES                     []Other:  []w/US   []w/ice   []w/heat  Position:  Location:     []  Traction: [] Cervical       []Lumbar                       [] Prone          []Supine                       []Intermittent   []Continuous Lbs:  [] before manual  [] after manual  []w/heat     []  Ultrasound: []Continuous   [] Pulsed at:                           []1MHz   []3MHz Location:  W/cm2:     [] Paraffin         Location:   []w/heat    10 [x]  Ice     []  Heat  []  Ice massage Position: sitting  Location: low back     []  Laser  []  Other: Position:  Location:   10    [x]  Vasopneumatic Device (R shoulder) Pressure:       [] lo [x] med [] hi   Temperature: 34      [x] Skin assessment post-treatment:  [x]intact []redness- no adverse reaction    []redness - adverse reaction:      24 min Therapeutic Exercise:  [x] See flow sheet :   Rationale: increase ROM, increase strength, and increase proprioception to improve the patients ability to perform functional activities with decreased pain or discomfort. 15 min Neuromuscular Re-education:  [x]  See flow sheet : 4-way R shoulder rhythmic stab at 45 degrees ER/flexion; bridge with band for muscle activation; PPT in supine; rows with PPT for trunk stability   Rationale: increase ROM, increase strength, and increase proprioception  to improve the patients ability to perform functional activities with decreased pain or discomfort. 15 min Manual Therapy: STM of R teres major and lats; grade III inferior mobs; grade III PA thoracic mobs in prone   Rationale: decrease pain, increase ROM, and increase tissue extensibility to improve the patients ability to reach overhead with decreased pain. With   [] TE   [] TA   [] Neuro   [] SC   [] other: Patient Education: [x] Review HEP    [] Progressed/Changed HEP based on:   [] positioning   [] body mechanics   [] transfers   [] heat/ice application    [] other:      Other Objective/Functional Measures: --     Pain Level (0-10 scale) post treatment: 0/10    ASSESSMENT/Changes in Function:   Pt able to increase reps for several exercises today. Advised pt on self mobilization for thoracic paraspinals and teres using a tennis ball along wall. Advised to do this 2-3 min 1x/day  Patient will continue to benefit from skilled PT services to modify and progress therapeutic interventions, address functional mobility deficits, address ROM deficits, address strength deficits, analyze and address soft tissue restrictions, analyze and cue movement patterns, analyze and modify body mechanics/ergonomics, and assess and modify postural abnormalities to attain remaining goals.      [x]  See Plan of Care  []  See progress note/recertification  []  See Discharge Summary         Progress towards goals / Updated goals:  Short and Long Term Goals: To be accomplished in 16 treatments:               1. Pt will be independent and compliant with HEP. 2. Pt will improve FOTO score by the MCID from 60 to >/= 74 demonstrating improved overall function with decreased pain or discomfort. 3. Pt will be able to reach overhead without increased right shoulder pain. 4. Pt will be able to sleep through the night without waking up due to shoulder pain. 5. Pt will be able to throw a softball without increased shoulder pain. 6. Pt will be able to walk >/= 20 minutes without increased right leg pain. 7. Pt will be able to ascend a ladder or stairs in a reciprocal pattern without increased right leg pain.     PLAN  [x]  Upgrade activities as tolerated     [x]  Continue plan of care  []  Update interventions per flow sheet       []  Discharge due to:_  []  Other:_      Joanna Duff, PTA 12/21/2022

## 2022-12-27 ENCOUNTER — HOSPITAL ENCOUNTER (OUTPATIENT)
Dept: PHYSICAL THERAPY | Age: 57
Discharge: HOME | End: 2022-12-27
Payer: COMMERCIAL

## 2022-12-27 PROCEDURE — 97140 MANUAL THERAPY 1/> REGIONS: CPT

## 2022-12-27 PROCEDURE — 97110 THERAPEUTIC EXERCISES: CPT

## 2022-12-27 PROCEDURE — 97016 VASOPNEUMATIC DEVICE THERAPY: CPT

## 2022-12-27 NOTE — PROGRESS NOTES
PT DAILY TREATMENT NOTE - [facility] 2-15    Patient Name: Noah Duncan  Date:2022  : 1965  [x]  Patient  Verified  Payor: BLUE CROSS / Plan: Klaudia Soto [id number] PPO / Product Type: PPO /    In time: 4:43P  Out time: 5:46P  Total Treatment Time (min): 63  Total Timed Codes (min): 53  1:1 Treatment Time ( only): 52  Visit #: 5    Treatment Area: Chronic right shoulder pain [M25.511, G89.29]  Strain of muscle, fascia and tendon of the posterior muscle group at thigh level, right thigh, sequela [S76.311S]    SUBJECTIVE  Pain Level (0-10 scale): 3/10  Any medication changes, allergies to medications, adverse drug reactions, diagnosis change, or new procedure performed?: [x] No    [] Yes (see summary sheet for update)  Subjective functional status/changes:   [] No changes reported  Pt reports shoulder feeling about the same; still some soreness with overhead activity (ie changing a light bulb). Shoulder pain not bothering him as much through the night. OBJECTIVE           Modality rationale: decrease inflammation and decrease pain to improve the patients ability to perform functional activities with decreased pain or discomfort.     Min Type Additional Details     [] Estim: []Att   []Unatt        []TENS instruct                  []IFC  []Premod   []NMES                     []Other:  []w/US   []w/ice   []w/heat  Position:  Location:     []  Traction: [] Cervical       []Lumbar                       [] Prone          []Supine                       []Intermittent   []Continuous Lbs:  [] before manual  [] after manual  []w/heat     []  Ultrasound: []Continuous   [] Pulsed at:                           []1MHz   []3MHz Location:  W/cm2:     [] Paraffin         Location:   []w/heat    10 [x]  Ice     []  Heat  []  Ice massage Position: sitting  Location: low back     []  Laser  []  Other: Position:  Location:   10    [x]  Vasopneumatic Device (R shoulder) Pressure:       [] lo [x] med [] hi   Temperature: 34      [x] Skin assessment post-treatment:  [x]intact []redness- no adverse reaction    []redness - adverse reaction:      37 min Therapeutic Exercise:  [x] See flow sheet :   Rationale: increase ROM, increase strength, and increase proprioception to improve the patients ability to perform functional activities with decreased pain or discomfort. 16 min Manual Therapy: STM of R teres major and lats and MWM into flexion PROM; grade III inferior mobs. STM/TPR R piriformis   Rationale: decrease pain, increase ROM, and increase tissue extensibility to improve the patients ability to reach overhead with decreased pain. With   [] TE   [] TA   [] Neuro   [] SC   [] other: Patient Education: [x] Review HEP    [] Progressed/Changed HEP based on:   [] positioning   [] body mechanics   [] transfers   [] heat/ice application    [] other:      Other Objective/Functional Measures: --     Pain Level (0-10 scale) post treatment: 4/10    ASSESSMENT/Changes in Function:   Initiated prone Ts, Ys, and Is for improved scapular strength/control; Pt tolerated well though did notice symptoms down into RLE; provided pilow under abdomen which resolved symptoms. Also noted RLE symptoms with SWB shoulder stab on wall; resolved with cueing for PPT/neutral spine secondary to excessive lumbar lordotic posture. Patient will continue to benefit from skilled PT services to modify and progress therapeutic interventions, address functional mobility deficits, address ROM deficits, address strength deficits, analyze and address soft tissue restrictions, analyze and cue movement patterns, analyze and modify body mechanics/ergonomics, and assess and modify postural abnormalities to attain remaining goals. [x]  See Plan of Care  []  See progress note/recertification  []  See Discharge Summary         Progress towards goals / Updated goals:  Short and Long Term Goals:  To be accomplished in 16 treatments:               1. Pt will be independent and compliant with HEP. 2. Pt will improve FOTO score by the MCID from 60 to >/= 74 demonstrating improved overall function with decreased pain or discomfort. 3. Pt will be able to reach overhead without increased right shoulder pain. - Progressing               4. Pt will be able to sleep through the night without waking up due to shoulder pain. - MET               5. Pt will be able to throw a softball without increased shoulder pain. - Progressing               6. Pt will be able to walk >/= 20 minutes without increased right leg pain. 7. Pt will be able to ascend a ladder or stairs in a reciprocal pattern without increased right leg pain.     PLAN  [x]  Upgrade activities as tolerated     [x]  Continue plan of care  []  Update interventions per flow sheet       []  Discharge due to:_  []  Other:_      Justine Dominguez, PT, DPT 12/27/2022

## 2023-01-03 ENCOUNTER — APPOINTMENT (OUTPATIENT)
Dept: PHYSICAL THERAPY | Age: 58
End: 2023-01-03
Payer: COMMERCIAL

## 2023-01-05 ENCOUNTER — APPOINTMENT (OUTPATIENT)
Dept: PHYSICAL THERAPY | Age: 58
End: 2023-01-05
Payer: COMMERCIAL

## 2023-01-10 ENCOUNTER — HOSPITAL ENCOUNTER (OUTPATIENT)
Dept: PHYSICAL THERAPY | Age: 58
Discharge: HOME | End: 2023-01-10
Payer: COMMERCIAL

## 2023-01-10 PROCEDURE — 97110 THERAPEUTIC EXERCISES: CPT

## 2023-01-10 PROCEDURE — 97140 MANUAL THERAPY 1/> REGIONS: CPT

## 2023-01-10 PROCEDURE — 97016 VASOPNEUMATIC DEVICE THERAPY: CPT

## 2023-01-10 NOTE — PROGRESS NOTES
Physical Therapy at [facility],   a part of [facility] [place]  [address] [address], [address] [place] [address], [address] [place] [address]  Phone: [phone] Fax: [phone]    Progress Note    Name: Catarina Krabbe   : 1965   MD: Estiven Kumari MD       Treatment Diagnosis: Chronic right shoulder pain [M25.511, G89.29]  Strain of muscle, fascia and tendon of the posterior muscle group at thigh level, right thigh, sequela [S76.311S]  Start of Care: 22    Visits from Start of Care: 6  Missed Visits: 0    Summary of Care: Mr. Quinn Soria has been seen for 6 skilled physical therapy visits for chronic right shoulder pain and right upper leg pain. Right upper leg pain presenting as lumbar radiculopathy secondary to spinal stenosis and is occurring with lumbar extension AROM or whenever he exhibits excessive lumbar lordosis in standing. Pt notes decreased pain intensity in leg overall, though would benefit from continued training for core stability and posture training with activity. R shoulder ROM showing limited progress up to this point, though Pt does demonstrate significantly improved shoulder ER activation/strength. He presents with right scapular winging and would benefit from additional therapy to further improve rotator cuff stability/strength as well as scapulohumeral mechanics. Thank you for this referral!    Assessment / Recommendations: Continue with PT to decrease pain and maximize function. Evaluation vs Today    R Shoulder ROM:                  AROM                         PROM              Flexion                         152 p! Proximal lateral arm 158  (165)       155 mild p!  160 mild p! Abduction                    145 p!    145 p!                      170 p!   170 p! IR                                 Hand to T10 p! T10 p! (T5)    60 p!  68 p!               ER                               Hand to T3 T4     (T3)      90 mild p!  90 mild p! UPPER QUARTER                             MUSCLE STRENGTH  KEY                                                                             R                      L  0 - No Contraction                               Flexion             5                      5  1 - Trace                                              Extension        5                      5  2 - Poor                                               Abduction        5                      5  3 - Fair                                                 IR                     5                      5  4 - Good                                              ER                   4- dull p!   5       5  5 - Normal                                           Progress towards goals / Updated goals:  Short and Long Term Goals: To be accomplished in 16 treatments:               1. Pt will be independent and compliant with HEP. - MET               2. Pt will improve FOTO score by the MCID from 60 to >/= 74 demonstrating improved overall function with decreased pain or discomfort. - Progressing               3. Pt will be able to reach overhead without increased right shoulder pain. - Progressing               4. Pt will be able to sleep through the night without waking up due to shoulder pain. - MET               5. Pt will be able to throw a softball without increased shoulder pain. - Progressing               6. Pt will be able to walk >/= 20 minutes without increased right leg pain. - Progressing               7. Pt will be able to ascend a ladder or stairs in a reciprocal pattern without increased right leg pain.  - [address], PT, DPT 1/10/2023

## 2023-01-10 NOTE — PROGRESS NOTES
PT DAILY TREATMENT NOTE - [facility] 2-15    Patient Name: Veronika Merrill  Date:1/10/2023  : 1965  [x]  Patient  Verified  Payor: BLUE CROSS / Plan: Klaudia Soto [id number] PPO / Product Type: PPO /    In time: 2:13 P  Out time: 3:18P  Total Treatment Time (min): 65  Total Timed Codes (min): 55  1:1 Treatment Time ( only): 54  Visit #: 6    Treatment Area: Chronic right shoulder pain [M25.511, G89.29]  Strain of muscle, fascia and tendon of the posterior muscle group at thigh level, right thigh, sequela [S76.311S]    SUBJECTIVE  Pain Level (0-10 scale): 0/10  Any medication changes, allergies to medications, adverse drug reactions, diagnosis change, or new procedure performed?: [x] No    [] Yes (see summary sheet for update)  Subjective functional status/changes:   [] No changes reported  Pt reports he thinks there has been a little bit of progress with back>shoulder. Pain not been quite as intense in leg, and shoulder has not been bothering him as much through the night. He has not been very active recently as he had a new grandson born on [place]. OBJECTIVE           Modality rationale: decrease inflammation and decrease pain to improve the patients ability to perform functional activities with decreased pain or discomfort.     Min Type Additional Details     [] Estim: []Att   []Unatt        []TENS instruct                  []IFC  []Premod   []NMES                     []Other:  []w/US   []w/ice   []w/heat  Position:  Location:     []  Traction: [] Cervical       []Lumbar                       [] Prone          []Supine                       []Intermittent   []Continuous Lbs:  [] before manual  [] after manual  []w/heat     []  Ultrasound: []Continuous   [] Pulsed at:                           []1MHz   []3MHz Location:  W/cm2:     [] Paraffin         Location:   []w/heat    []  Ice     []  Heat  []  Ice massage Position:   Location:      []  Laser  []  Other: Position:  Location:   10    [x] Vasopneumatic Device (R shoulder) Pressure:       [] lo [x] med [] hi   Temperature: 34      [x] Skin assessment post-treatment:  [x]intact []redness- no adverse reaction    []redness - adverse reaction:      47 min Therapeutic Exercise:  [x] See flow sheet :   Rationale: increase ROM, increase strength, and increase proprioception to improve the patients ability to perform functional activities with decreased pain or discomfort. 8 min Manual Therapy: STM R levator scap in prone; Grade III-IV prone thoracic mobs   Rationale: decrease pain, increase ROM, and increase tissue extensibility to improve the patients ability to reach overhead with decreased pain. With   [] TE   [] TA   [] Neuro   [] SC   [] other: Patient Education: [x] Review HEP    [] Progressed/Changed HEP based on:   [] positioning   [] body mechanics   [] transfers   [] heat/ice application    [] other:      Other Objective/Functional Measures:  FOTO Score: 59/100 (60 on eval)    Lumbar extension AROM: 100%; LBP and and R upper leg pain    R Shoulder ROM:                  AROM                         PROM              Flexion                         152 p! Proximal lateral arm 158  (165)       155 mild p!  160 mild p! Abduction                    145 p!    145 p!                      170 p!   170 p! IR                                 Hand to T10 p! T10 p! (T5)    60 p!  68 p!               ER                               Hand to T3 T4     (T3)      90 mild p!  90 mild p!    R winging posture     UPPER QUARTER                             MUSCLE STRENGTH  KEY                                                                             R                      L  0 - No Contraction                               Flexion             5                      5  1 - Trace                                              Extension        5                      5  2 - Poor                                               Abduction 5                      5  3 - Fair                                                 IR                     5                      5  4 - Good                                              ER                   4- dull p!   5       5  5 - Normal                                   Pain Level (0-10 scale) post treatment: 0/10    ASSESSMENT/Changes in Function:      []  See Plan of Care  [x]  See progress note/recertification  []  See Discharge Summary         Progress towards goals / Updated goals:  Short and Long Term Goals: To be accomplished in 16 treatments:               1. Pt will be independent and compliant with HEP. - MET               2. Pt will improve FOTO score by the MCID from 60 to >/= 74 demonstrating improved overall function with decreased pain or discomfort. - Progressing               3. Pt will be able to reach overhead without increased right shoulder pain. - Progressing               4. Pt will be able to sleep through the night without waking up due to shoulder pain. - MET               5. Pt will be able to throw a softball without increased shoulder pain. - Progressing               6. Pt will be able to walk >/= 20 minutes without increased right leg pain. - Progressing               7. Pt will be able to ascend a ladder or stairs in a reciprocal pattern without increased right leg pain.  - Progressing    PLAN  [x]  Upgrade activities as tolerated     [x]  Continue plan of care  []  Update interventions per flow sheet       []  Discharge due to:_  []  Other:_      Trisha Richardson, PT, DPT 1/10/2023

## 2023-01-12 ENCOUNTER — HOSPITAL ENCOUNTER (OUTPATIENT)
Dept: PHYSICAL THERAPY | Age: 58
Discharge: HOME | End: 2023-01-12
Payer: COMMERCIAL

## 2023-01-12 PROCEDURE — 97110 THERAPEUTIC EXERCISES: CPT

## 2023-01-12 PROCEDURE — 97140 MANUAL THERAPY 1/> REGIONS: CPT

## 2023-01-12 PROCEDURE — 97016 VASOPNEUMATIC DEVICE THERAPY: CPT

## 2023-01-12 NOTE — PROGRESS NOTES
PT DAILY TREATMENT NOTE - [facility] 2-15    Patient Name: Leti Bardales  Date:2023  : 1965  [x]  Patient  Verified  Payor: BLUE CROSS / Plan: Klaudia Soto [id number] PPO / Product Type: PPO /    In time: 4:47 P  Out time: 5:36 P  Total Treatment Time (min): 49  Total Timed Codes (min): 39  1:1 Treatment Time ( only): 39  Visit #: 7    Treatment Area: Chronic right shoulder pain [M25.511, G89.29]  Strain of muscle, fascia and tendon of the posterior muscle group at thigh level, right thigh, sequela [S76.311S]    SUBJECTIVE  Pain Level (0-10 scale): 0  Any medication changes, allergies to medications, adverse drug reactions, diagnosis change, or new procedure performed?: [x] No    [] Yes (see summary sheet for update)  Subjective functional status/changes:   [] No changes reported  Pt reports ROM still getting better, particularly into flexion. Still some soreness/tenderness. OBJECTIVE           Modality rationale: decrease inflammation and decrease pain to improve the patients ability to perform functional activities with decreased pain or discomfort.     Min Type Additional Details     [] Estim: []Att   []Unatt        []TENS instruct                  []IFC  []Premod   []NMES                     []Other:  []w/US   []w/ice   []w/heat  Position:  Location:     []  Traction: [] Cervical       []Lumbar                       [] Prone          []Supine                       []Intermittent   []Continuous Lbs:  [] before manual  [] after manual  []w/heat     []  Ultrasound: []Continuous   [] Pulsed at:                           []1MHz   []3MHz Location:  W/cm2:     [] Paraffin         Location:   []w/heat    []  Ice     []  Heat  []  Ice massage Position:   Location:      []  Laser  []  Other: Position:  Location:   10    [x]  Vasopneumatic Device (R shoulder) Pressure:       [] lo [x] med [] hi   Temperature: 34      [x] Skin assessment post-treatment:  [x]intact []redness- no adverse reaction    []redness - adverse reaction:      30 min Therapeutic Exercise:  [x] See flow sheet :   Rationale: increase ROM, increase strength, and increase proprioception to improve the patients ability to perform functional activities with decreased pain or discomfort. 9 min Manual Therapy: STM R levator scap in prone; Grade V prone thoracic mobs; inferior GH mobs at 90 deg and end-range; AP GH mobs   Rationale: decrease pain, increase ROM, and increase tissue extensibility to improve the patients ability to reach overhead with decreased pain. With   [] TE   [] TA   [] Neuro   [] SC   [] other: Patient Education: [x] Review HEP    [] Progressed/Changed HEP based on:   [] positioning   [] body mechanics   [] transfers   [] heat/ice application    [] other:      Other Objective/Functional Measures: --    Pain Level (0-10 scale) post treatment: 0/10    ASSESSMENT/Changes in Function:   Provided cueing with quadruped push-up plus for correct movement and to decrease UT compensation. Corrected band ER in standing to decrease anterior translation of humeral head and to retract/depress scapula to maximize intended muscle activation. Pt noted RLE symptoms with standing scaption; instructed Pt to perform with back against wall and PPT which resolved symptoms. Patient will continue to benefit from skilled PT services to modify and progress therapeutic interventions, address functional mobility deficits, address ROM deficits, address strength deficits, analyze and address soft tissue restrictions, analyze and cue movement patterns, analyze and modify body mechanics/ergonomics, and assess and modify postural abnormalities to attain remaining goals. []  See Plan of Care  [x]  See progress note/recertification  []  See Discharge Summary         Progress towards goals / Updated goals:  Short and Long Term Goals:  To be accomplished in 16 treatments:               1. Pt will be independent and compliant with HEP. - MET               2. Pt will improve FOTO score by the MCID from 60 to >/= 74 demonstrating improved overall function with decreased pain or discomfort. - Progressing               3. Pt will be able to reach overhead without increased right shoulder pain. - Progressing               4. Pt will be able to sleep through the night without waking up due to shoulder pain. - MET               5. Pt will be able to throw a softball without increased shoulder pain. - Progressing               6. Pt will be able to walk >/= 20 minutes without increased right leg pain. - Progressing               7. Pt will be able to ascend a ladder or stairs in a reciprocal pattern without increased right leg pain.  - Progressing    PLAN  [x]  Upgrade activities as tolerated     [x]  Continue plan of care  []  Update interventions per flow sheet       []  Discharge due to:_  []  Other:_      Cayla Ramsey, PT, DPT 1/12/2023

## 2023-01-18 ENCOUNTER — HOSPITAL ENCOUNTER (OUTPATIENT)
Dept: PHYSICAL THERAPY | Age: 58
Discharge: HOME | End: 2023-01-18
Payer: COMMERCIAL

## 2023-01-18 PROCEDURE — 97110 THERAPEUTIC EXERCISES: CPT | Performed by: PHYSICAL THERAPIST

## 2023-01-18 NOTE — PROGRESS NOTES
PT DAILY TREATMENT NOTE - [facility] 2-15    Patient Name: Hansel Castro  Date:2023  : 1965  [x]  Patient  Verified  Payor: BLUE CROSS / Plan: Klaudia Soto [id number] PPO / Product Type: PPO /    In time: 914 A  Out time: 592 A  Total Treatment Time (min): 42  Total Timed Codes (min): 32  1:1 Treatment Time ( only): 30  Visit #: 8    Treatment Area: Chronic right shoulder pain [M25.511, G89.29]  Strain of muscle, fascia and tendon of the posterior muscle group at thigh level, right thigh, sequela [S76.311S]    SUBJECTIVE  Pain Level (0-10 scale): 2/10  Any medication changes, allergies to medications, adverse drug reactions, diagnosis change, or new procedure performed?: [x] No    [] Yes (see summary sheet for update)  Subjective functional status/changes:   [] No changes reported  Pt reports sore and stiff this morning with pain into RLE. Notes his shoulder has been feeling pretty good, but over the weekend was pretty active and has had increased RLE pain. Notes the pain is not going as far down his leg as it was previously. Pt noted compliance with exercises given for low back, but only able to verbalize figure 4 stretch and reports sitting when he has pain. OBJECTIVE           Modality rationale: decrease inflammation and decrease pain to improve the patients ability to perform functional activities with decreased pain or discomfort.     Min Type Additional Details     [] Estim: []Att   []Unatt        []TENS instruct                  []IFC  []Premod   []NMES                     []Other:  []w/US   []w/ice   []w/heat  Position:  Location:     []  Traction: [] Cervical       []Lumbar                       [] Prone          []Supine                       []Intermittent   []Continuous Lbs:  [] before manual  [] after manual  []w/heat     []  Ultrasound: []Continuous   [] Pulsed at:                           []1MHz   []3MHz Location:  W/cm2:     [] Paraffin         Location:   []w/heat   10 [x]  Ice []  Heat  []  Ice massage Position: supine with legs elevated  Location: lumbar spine     []  Laser  []  Other: Position:  Location:       []  Vasopneumatic Device (R shoulder) Pressure:       [] lo [x] med [] hi   Temperature: 34      [x] Skin assessment post-treatment:  [x]intact []redness- no adverse reaction    []redness - adverse reaction:      27 min Therapeutic Exercise:  [x] See flow sheet :   Rationale: increase ROM, increase strength, and increase proprioception to improve the patients ability to perform functional activities with decreased pain or discomfort. 5 min Manual Therapy:  lumbar distraction with mobilization belt   Rationale: decrease pain, increase ROM, and increase tissue extensibility to improve the patients ability to reach overhead with decreased pain. With   [] TE   [] TA   [] Neuro   [] SC   [] other: Patient Education: [x] Review HEP    [] Progressed/Changed HEP based on:   [] positioning   [] body mechanics   [] transfers   [] heat/ice application    [] other:      Other Objective/Functional Measures: Pt required cueing during PPT to relax lumbar paraspinals to help reduce pain felt in low back with performance. Attempted R hip flexor stretch at stairs, but unable to perform without increased radicular symptoms and therefore performed in supine with PT overpressure. Pain Level (0-10 scale) post treatment: 0/10    ASSESSMENT/Changes in Function:   Participated well with skilled PT services focusing only on lumbar spine today. Treatment was limited due to pt arriving 14 min late.    Patient will continue to benefit from skilled PT services to modify and progress therapeutic interventions, address functional mobility deficits, address ROM deficits, address strength deficits, analyze and address soft tissue restrictions, analyze and cue movement patterns, analyze and modify body mechanics/ergonomics, and assess and modify postural abnormalities to attain remaining goals.  []  See Plan of Care  [x]  See progress note/recertification  []  See Discharge Summary         Progress towards goals / Updated goals:  Short and Long Term Goals: To be accomplished in 16 treatments:               1. Pt will be independent and compliant with HEP. - MET               2. Pt will improve FOTO score by the MCID from 60 to >/= 74 demonstrating improved overall function with decreased pain or discomfort. - Progressing               3. Pt will be able to reach overhead without increased right shoulder pain. - Progressing               4. Pt will be able to sleep through the night without waking up due to shoulder pain. - MET               5. Pt will be able to throw a softball without increased shoulder pain. - Progressing               6. Pt will be able to walk >/= 20 minutes without increased right leg pain. - Progressing               7. Pt will be able to ascend a ladder or stairs in a reciprocal pattern without increased right leg pain.  - Progressing    PLAN  [x]  Upgrade activities as tolerated     [x]  Continue plan of care  []  Update interventions per flow sheet       []  Discharge due to:_  []  Other:_      Swati John, PT 1/18/2023

## 2023-01-25 ENCOUNTER — HOSPITAL ENCOUNTER (OUTPATIENT)
Dept: PHYSICAL THERAPY | Age: 58
Discharge: HOME | End: 2023-01-25
Payer: COMMERCIAL

## 2023-01-25 PROCEDURE — 97014 ELECTRIC STIMULATION THERAPY: CPT

## 2023-01-25 PROCEDURE — 97110 THERAPEUTIC EXERCISES: CPT

## 2023-01-25 PROCEDURE — 97112 NEUROMUSCULAR REEDUCATION: CPT

## 2023-01-25 NOTE — PROGRESS NOTES
PT DAILY TREATMENT NOTE - [facility] -15    Patient Name: aMrgy Calvert  Date:2023  : 1965  [x]  Patient  Verified  Payor: BLUE CROSS / Plan: Klaudia Soto [id number] PPO / Product Type: PPO /    In time: 10:03 A  Out time:11:12 A  Total Treatment Time (min): 69  Total Timed Codes (min): 59  1:1 Treatment Time ( only): 47  Visit #: 9    Treatment Area: Chronic right shoulder pain [M25.511, G89.29]  Strain of muscle, fascia and tendon of the posterior muscle group at thigh level, right thigh, sequela [S76.311S]    SUBJECTIVE  Pain Level (0-10 scale): 2/10  Any medication changes, allergies to medications, adverse drug reactions, diagnosis change, or new procedure performed?: [x] No    [] Yes (see summary sheet for update)  Subjective functional status/changes:   [] No changes reported  Pt reports increased low back pain and leg pain after about 5 minutes of standing/walking. Shoulder is getting better. OBJECTIVE           Modality rationale: decrease inflammation and decrease pain to improve the patients ability to perform functional activities with decreased pain or discomfort.     Min Type Additional Details    10 [x] Estim: []Att   [x]Unatt        []TENS instruct                  [x]IFC  []Premod   []NMES                     []Other:  []w/US   [x]w/ice   []w/heat  Position: Supine  Location: Lumbar     []  Traction: [] Cervical       []Lumbar                       [] Prone          []Supine                       []Intermittent   []Continuous Lbs:  [] before manual  [] after manual  []w/heat     []  Ultrasound: []Continuous   [] Pulsed at:                           []1MHz   []3MHz Location:  W/cm2:     [] Paraffin         Location:   []w/heat    []  Ice     []  Heat  []  Ice massage Position:   Location:      []  Laser  []  Other: Position:  Location:       []  Vasopneumatic Device (R shoulder) Pressure:       [] lo [x] med [] hi   Temperature: 34      [x] Skin assessment post-treatment:  [x]intact []redness- no adverse reaction    []redness - adverse reaction:      47 min Therapeutic Exercise:  [x] See flow sheet :   Rationale: increase ROM, increase strength, and increase proprioception to improve the patients ability to perform functional activities with decreased pain or discomfort. 12 min Neuromuscular Re-education:  []  See flow sheet : PPT with and without marches; seated core rotations; core press   Rationale: increase strength and increase proprioception  to improve the patients ability to perform functional activities with decreased pain or discomfort. With   [] TE   [] TA   [] Neuro   [] SC   [] other: Patient Education: [x] Review HEP    [] Progressed/Changed HEP based on:   [] positioning   [] body mechanics   [] transfers   [] heat/ice application    [] other:      Other Objective/Functional Measures:   (-) long sit and sacral thrust tests    Pain Level (0-10 scale) post treatment: 0/10    ASSESSMENT/Changes in Function:   Session focused on low back/radicular symptoms today. Introduced multifidus lifts with extensive verbal and tactile cueing for positioning and minimized compensations. Pt required cueing with PPT to reduce abdominal bracing and LE compensations; Pt able to correct. Pt noted symptoms into RLE with core press initially; provided tactile cueing for posture/PPT and Pt noted relief of symptoms. Patient will continue to benefit from skilled PT services to modify and progress therapeutic interventions, address functional mobility deficits, address ROM deficits, address strength deficits, analyze and address soft tissue restrictions, analyze and cue movement patterns, analyze and modify body mechanics/ergonomics, and assess and modify postural abnormalities to attain remaining goals. []  See Plan of Care  [x]  See progress note/recertification  []  See Discharge Summary         Progress towards goals / Updated goals:  Short and Long Term Goals:  To be accomplished in 16 treatments:               1. Pt will be independent and compliant with HEP. - MET               2. Pt will improve FOTO score by the MCID from 60 to >/= 74 demonstrating improved overall function with decreased pain or discomfort. - Progressing               3. Pt will be able to reach overhead without increased right shoulder pain. - Progressing               4. Pt will be able to sleep through the night without waking up due to shoulder pain. - MET               5. Pt will be able to throw a softball without increased shoulder pain. - Progressing               6. Pt will be able to walk >/= 20 minutes without increased right leg pain. - Progressing               7. Pt will be able to ascend a ladder or stairs in a reciprocal pattern without increased right leg pain.  - Progressing    PLAN  [x]  Upgrade activities as tolerated     [x]  Continue plan of care  []  Update interventions per flow sheet       []  Discharge due to:_  []  Other:_      Gladys Graff, PT, DPT 1/25/2023

## 2023-01-30 ENCOUNTER — APPOINTMENT (OUTPATIENT)
Dept: PHYSICAL THERAPY | Age: 58
End: 2023-01-30
Payer: COMMERCIAL

## 2023-02-01 ENCOUNTER — HOSPITAL ENCOUNTER (OUTPATIENT)
Dept: PHYSICAL THERAPY | Age: 58
Discharge: HOME | End: 2023-02-01
Payer: COMMERCIAL

## 2023-02-01 PROCEDURE — 97112 NEUROMUSCULAR REEDUCATION: CPT

## 2023-02-01 PROCEDURE — 97012 MECHANICAL TRACTION THERAPY: CPT

## 2023-02-01 PROCEDURE — 97110 THERAPEUTIC EXERCISES: CPT

## 2023-02-01 NOTE — PROGRESS NOTES
PT DAILY TREATMENT NOTE - [facility] -15    Patient Name: Melyssa Garcia  Date:2023  : 1965  [x]  Patient  Verified  Payor: BLUE CROSS / Plan: Klaudia Soto [id number] PPO / Product Type: PPO /    In time: 7:08 A  Out time:8:06 A  Total Treatment Time (min): 58  Total Timed Codes (min): 48  1:1 Treatment Time ( only): 44  Visit #: 10    Treatment Area: Chronic right shoulder pain [M25.511, G89.29]  Strain of muscle, fascia and tendon of the posterior muscle group at thigh level, right thigh, sequela [S76.311S]    SUBJECTIVE  Pain Level (0-10 scale): 1/10  Any medication changes, allergies to medications, adverse drug reactions, diagnosis change, or new procedure performed?: [x] No    [] Yes (see summary sheet for update)  Subjective functional status/changes:   [] No changes reported  Pt reports his low back has felt better the last couple of days. Still constant dull ache, but less intense overall, and less intense leg pain. Shoulder also feels like it is improving. Shoulder still wakes him up intermittently at night, though feels increased mobility. Both issues seem to improve as the day goes on. OBJECTIVE    Modality rationale: decrease pain to improve the patients ability to ambulate with decreased pain or discomfort.     Min Type Additional Details       [] Estim: []Att   []Unatt    []TENS instruct                  []IFC  []Premod   []NMES                     []Other:  []w/US   []w/ice   []w/heat  Position:  Location:      10 [x]  Traction: [] Cervical       [x]Lumbar                       [] Prone          [x]Supine                       []Intermittent   []Continuous Lbs: 40% BW  [] before manual  [] after manual  []w/heat    []  Ultrasound: []Continuous   [] Pulsed                       at: []1MHz   []3MHz Location:  W/cm2:    [] Paraffin         Location:   []w/heat    []  Ice     []  Heat  []  Ice massage Position:  Location:    []  Laser  []  Other: Position:  Location:      [] Vasopneumatic Device Pressure:       [] lo [] med [] hi   Temperature:      [x] Skin assessment post-treatment:  [x]intact []redness- no adverse reaction    []redness - adverse reaction:        22 min Therapeutic Exercise:  [x] See flow sheet :   Rationale: increase ROM, increase strength, and increase proprioception to improve the patients ability to perform functional activities with decreased pain or discomfort. 26 min Neuromuscular Re-education:  []  See flow sheet : PPT with and without marches; multifidus lifts with tactile cueing for position/technique; unilateral push-up plus; standing hip abduction with cueing for PPT; 4-way rhythmic stab at 45 degrees abduction and 90 degrees flexion; D1 and D2 UE PNF with repeated contractions   Rationale: increase strength and increase proprioception  to improve the patients ability to perform functional activities with decreased pain or discomfort. With   [] TE   [] TA   [] Neuro   [] SC   [] other: Patient Education: [x] Review HEP    [] Progressed/Changed HEP based on:   [] positioning   [] body mechanics   [] transfers   [] heat/ice application    [] other:      Other Objective/Functional Measures:   (-) long sit and sacral thrust tests    Pain Level (0-10 scale) post treatment: 0/10    ASSESSMENT/Changes in Function:   Full shoulder flexion PROM today without pain. Excessive lumbar lordosis in quadruped for multifidus lifts secondary to impaired proprioception; cued for PPT and neutral positioning and Pt able to correct. Pt unable to maintain neutral spine with side stepping and noted leg pain as a result; modified to standing hip abduction with cueing for PPT and Pt able to perform correctly and without pain.       Patient will continue to benefit from skilled PT services to modify and progress therapeutic interventions, address functional mobility deficits, address ROM deficits, address strength deficits, analyze and address soft tissue restrictions, analyze and cue movement patterns, analyze and modify body mechanics/ergonomics, and assess and modify postural abnormalities to attain remaining goals. []  See Plan of Care  [x]  See progress note/recertification  []  See Discharge Summary         Progress towards goals / Updated goals:  Short and Long Term Goals: To be accomplished in 16 treatments:               1. Pt will be independent and compliant with HEP. - MET               2. Pt will improve FOTO score by the MCID from 60 to >/= 74 demonstrating improved overall function with decreased pain or discomfort. - Progressing               3. Pt will be able to reach overhead without increased right shoulder pain. - Progressing               4. Pt will be able to sleep through the night without waking up due to shoulder pain. - MET               5. Pt will be able to throw a softball without increased shoulder pain. - Progressing               6. Pt will be able to walk >/= 20 minutes without increased right leg pain. - Progressing               7. Pt will be able to ascend a ladder or stairs in a reciprocal pattern without increased right leg pain.  - Progressing    PLAN  [x]  Upgrade activities as tolerated     [x]  Continue plan of care  []  Update interventions per flow sheet       []  Discharge due to:_  []  Other:_      Luz Elena Apo, PT, DPT 2/1/2023

## 2023-02-22 ENCOUNTER — HOSPITAL ENCOUNTER (OUTPATIENT)
Dept: PHYSICAL THERAPY | Age: 58
Discharge: HOME | End: 2023-02-22
Payer: COMMERCIAL

## 2023-02-22 PROCEDURE — 97112 NEUROMUSCULAR REEDUCATION: CPT

## 2023-02-22 PROCEDURE — 97110 THERAPEUTIC EXERCISES: CPT

## 2023-02-22 PROCEDURE — 97012 MECHANICAL TRACTION THERAPY: CPT

## 2023-02-22 NOTE — PROGRESS NOTES
PT DAILY TREATMENT NOTE - [facility] 2-15    Patient Name: Kena Rahman  Date:2023  : 1965  [x]  Patient  Verified  Payor: BLUE CROSS / Plan: Klaudia Soto [id number] PPO / Product Type: PPO /    In time: 9:20 A  Out time:10:28 A  Total Treatment Time (min): 68  Total Timed Codes (min): 58  1:1 Treatment Time ( only): 53  Visit #: 11    Treatment Area: Chronic right shoulder pain [M25.511, G89.29]  Strain of muscle, fascia and tendon of the posterior muscle group at thigh level, right thigh, sequela [S76.311S]    SUBJECTIVE  Pain Level (0-10 scale): 2/10  Any medication changes, allergies to medications, adverse drug reactions, diagnosis change, or new procedure performed?: [x] No    [] Yes (see summary sheet for update)  Subjective functional status/changes:   [] No changes reported  Pt reports he feels like his shoulder is making good progress; shoulder pain primarily only at night. The back is better overall, but not making as much progress. Prolonged standing or walking continues to result in increased low back and right upper leg pain. OBJECTIVE    Modality rationale: decrease pain to improve the patients ability to ambulate with decreased pain or discomfort.     Min Type Additional Details       [] Estim: []Att   []Unatt    []TENS instruct                  []IFC  []Premod   []NMES                     []Other:  []w/US   []w/ice   []w/heat  Position:  Location:      10 [x]  Traction: [] Cervical       [x]Lumbar                       [] Prone          [x]Supine                       []Intermittent   []Continuous Lbs: 50% 178#  [] before manual  [] after manual  []w/heat    []  Ultrasound: []Continuous   [] Pulsed                       at: []1MHz   []3MHz Location:  W/cm2:    [] Paraffin         Location:   []w/heat    []  Ice     []  Heat  []  Ice massage Position:  Location:    []  Laser  []  Other: Position:  Location:      []  Vasopneumatic Device Pressure:       [] lo [] med [] hi Temperature:      [x] Skin assessment post-treatment:  [x]intact []redness- no adverse reaction    []redness - adverse reaction:        48 min Therapeutic Exercise:  [x] See flow sheet :   Rationale: increase ROM, increase strength, and increase proprioception to improve the patients ability to perform functional activities with decreased pain or discomfort. 10 min Neuromuscular Re-education:  []  See flow sheet : PPT with and without marches; seated core rotations   Rationale: increase strength and increase proprioception  to improve the patients ability to perform functional activities with decreased pain or discomfort. With   [] TE   [] TA   [] Neuro   [] SC   [] other: Patient Education: [x] Review HEP    [] Progressed/Changed HEP based on:   [] positioning   [] body mechanics   [] transfers   [] heat/ice application    [] other:      Other Objective/Functional Measures:   0-100: 70% improved (shoulder)   50% improved (LBP, leg pain): not as intense pain, but frequency the same; limit is 3/4 mile walking. Full relief with sitting/resting    FOTO Score: 72/100 (60 on eval)    Lumbar AROM:  Flexion: 100%  Extension: 100%; mild low back pain  R SB: 50-75% moderate LBP and R leg pain  L SB: 100%  R rot: 100% L rot: 100%     Hip abduction: R: 5  L: 4    L lower quadrant test: (+)  R: (-)    HS SLR: R: (-) 95   L: (+) 75 LBP     R Shoulder ROM:                  AROM                         PROM              Flexion                         152 p! Proximal lateral arm 160  (165)       155 mild p!  180 mild discomfort              Abduction                    145 p!    150 p!                      170 p!   175 mild discomfort              IR                                 Hand to T10 p!  T10 (T5)    60 p!  80 mild discomfort              ER                               Hand to T3 T4     (T3)      90 mild p!  90 mild discomfort     UPPER QUARTER                             MUSCLE STRENGTH  KEY R                      0 - No Contraction                               Flexion             5                       1 - Trace                                              Extension        5                       2 - Poor                                               Abduction        5                       3 - Fair                                                 IR                     5                       4 - Good                                              ER                   4- dull p!  5 no p!      5 - Normal                                    Pain Level (0-10 scale) post treatment: 0/10    ASSESSMENT/Changes in Function:     []  See Plan of Care  [x]  See progress note/recertification  []  See Discharge Summary         Progress towards goals / Updated goals:  Short and Long Term Goals: To be accomplished in 16 treatments:               1. Pt will be independent and compliant with HEP. - MET               2. Pt will improve FOTO score by the MCID from 60 to >/= 74 demonstrating improved overall function with decreased pain or discomfort. - Progressing (72)               3. Pt will be able to reach overhead without increased right shoulder pain. - MET               4. Pt will be able to sleep through the night without waking up due to shoulder pain. - MET               5. Pt will be able to throw a softball without increased shoulder pain. - Progressing               6. Pt will be able to walk >/= 20 minutes without increased right leg pain. - Progressing               7. Pt will be able to ascend a ladder or stairs in a reciprocal pattern without increased right leg pain.  - Progressing    PLAN  [x]  Upgrade activities as tolerated     [x]  Continue plan of care  []  Update interventions per flow sheet       []  Discharge due to:_  []  Other:_      Kari Brown, PT, DPT 2/22/2023

## 2023-02-22 NOTE — PROGRESS NOTES
Physical Therapy at [facility],   a part of [address] [facility]  [address] [address], [address] [place] [address], [address] [facility]  Phone: [phone] Fax: [phone]    Progress Note    Name: Nancy Overall   : 1965   MD: Linn Faulkner MD       Treatment Diagnosis: Chronic right shoulder pain [M25.511, G89.29]  Strain of muscle, fascia and tendon of the posterior muscle group at thigh level, right thigh, sequela [S76.311S]  Start of Care: 22    Visits from Start of Care: 11  Missed Visits: 1    Summary of Care: Mr. Isael Freeman has been seen for 11 skilled physical therapy visits secondary to chronic right shoulder pain and right-sided lumbar dysfunction/radiculopathy. Shoulder ROM and pain continuing to progress very well and Pt reports feeling 70% improved overall. Only notes shoulder pain at night at this point, but is no longer limited functionally by shoulder pain throughout the day. Pt is continuing to experience right-sided lumbar radicular symptoms secondary to facet syndrome/stenosis. Radicular symptoms only traveling to R upper leg, particularly with standing/walking, though no symptoms below knee or LLE. Pt also demonstrates impaired right hip abduction activation/strength. He may benefit from inclusion of dry needling in therapy program to improve muscle activation and decrease low back/radicular pain. He would benefit from additional therapy to further improve standing tolerance and decrease pain. Thank you for this referral!    Assessment / Recommendations: He may benefit from inclusion of dry needling in therapy program to improve muscle activation and decrease low back/radicular pain. He would benefit from additional therapy to further improve standing tolerance and decrease pain. 0-100: 70% improved (shoulder)              50% improved (LBP, leg pain): decreased pain intensity; limit is 3/4 mile walking.  Full relief with sitting/resting     FOTO Score: 72/100 (60 on eval)     Lumbar AROM:  Flexion: 100%  Extension: 100%; mild low back pain  R SB: 50-75% moderate LBP and R leg pain  L SB: 100%  R rot: 100% L rot: 100%      Hip abduction: R: 5  L: 4-/4     Lower quadrant test: L: (+)  R: (-)     HS SLR: L: (+) 75 LBP    R: (-) 95        R Shoulder ROM:                  AROM                         PROM              Flexion                         152 p! Proximal lateral arm 160  (165)       155 mild p!  180 mild discomfort              Abduction                    145 p!    150 p!                      170 p!   175 mild discomfort              IR                                 Hand to T10 p! T10 (T5)    60 p!  80 mild discomfort              ER                               Hand to T3 T4     (T3)      90 mild p!  90 mild discomfort     UPPER QUARTER                             MUSCLE STRENGTH  KEY                                                                             R                      0 - No Contraction                               Flexion             5                       1 - Trace                                              Extension        5                       2 - Poor                                               Abduction        5                       3 - Fair                                                 IR                     5                       4 - Good                                              ER                   4- dull p!  5 no p!      5 - Normal                                    Progress towards goals / Updated goals:  Short and Long Term Goals: To be accomplished in 16 treatments:               1. Pt will be independent and compliant with HEP. - MET               2. Pt will improve FOTO score by the MCID from 60 to >/= 74 demonstrating improved overall function with decreased pain or discomfort. - Progressing (72)               3. Pt will be able to reach overhead without increased right shoulder pain.  - MET               4. Pt will be able to sleep through the night without waking up due to shoulder pain. - MET               5. Pt will be able to throw a softball without increased shoulder pain. - Progressing               6. Pt will be able to walk >/= 20 minutes without increased right leg pain. - Progressing               7. Pt will be able to ascend a ladder or stairs in a reciprocal pattern without increased right leg pain.  - [facility], PT, DPT 2/22/2023

## 2023-03-02 ENCOUNTER — HOSPITAL ENCOUNTER (OUTPATIENT)
Dept: PHYSICAL THERAPY | Age: 58
Discharge: HOME | End: 2023-03-02
Payer: COMMERCIAL

## 2023-03-02 PROCEDURE — 97110 THERAPEUTIC EXERCISES: CPT

## 2023-03-02 PROCEDURE — 97012 MECHANICAL TRACTION THERAPY: CPT

## 2023-03-02 PROCEDURE — 97112 NEUROMUSCULAR REEDUCATION: CPT

## 2023-03-02 NOTE — PROGRESS NOTES
PT DAILY TREATMENT NOTE - [facility] 2-15    Patient Name: Sandrita Bejarano  Date:3/2/2023  : 1965  [x]  Patient  Verified  Payor: BLUE CROSS / Plan: Klaudia Soto [id number] PPO / Product Type: PPO /    In time: 4:46P  Out time: 5:50P  Total Treatment Time (min): 64  Total Timed Codes (min): 54  1:1 Treatment Time ( only): 44  Visit #: 12    Treatment Area: Chronic right shoulder pain [M25.511, G89.29]  Strain of muscle, fascia and tendon of the posterior muscle group at thigh level, right thigh, sequela [S76.311S]    SUBJECTIVE  Pain Level (0-10 scale): 2/10  Any medication changes, allergies to medications, adverse drug reactions, diagnosis change, or new procedure performed?: [x] No    [] Yes (see summary sheet for update)  Subjective functional status/changes:   [] No changes reported  Pt reports he walked 5 miles on Saturday with 3-4/10 leg>back pain (would have been 6/10 prior, per Pt), but it did not increase beyond this level and immediately relieved upon sitting afterwards. Some increased R shoulder soreness waking him up at night, but no other issues during the day. OBJECTIVE    Modality rationale: decrease pain to improve the patients ability to ambulate with decreased pain or discomfort.     Min Type Additional Details       [] Estim: []Att   []Unatt    []TENS instruct                  []IFC  []Premod   []NMES                     []Other:  []w/US   []w/ice   []w/heat  Position:  Location:      10 [x]  Traction: [] Cervical       [x]Lumbar                       [] Prone          [x]Supine                       []Intermittent   []Continuous Lbs: 50% 178#  [] before manual  [] after manual  []w/heat    []  Ultrasound: []Continuous   [] Pulsed                       at: []1MHz   []3MHz Location:  W/cm2:    [] Paraffin         Location:   []w/heat    []  Ice     []  Heat  []  Ice massage Position:  Location:    []  Laser  []  Other: Position:  Location:      []  Vasopneumatic Device Pressure:       [] lo [] med [] hi   Temperature:      [x] Skin assessment post-treatment:  [x]intact []redness- no adverse reaction    []redness - adverse reaction:        39 min Therapeutic Exercise:  [x] See flow sheet :   Rationale: increase ROM, increase strength, and increase proprioception to improve the patients ability to perform functional activities with decreased pain or discomfort. 15 min Neuromuscular Re-education:  []  See flow sheet : PPT with and without marches; SLS on foam with mirror for visual feedback of maintaining level pelvis/decreased trunk lean; bird dogs   Rationale: increase strength and increase proprioception  to improve the patients ability to perform functional activities with decreased pain or discomfort. With   [] TE   [] TA   [] Neuro   [] SC   [] other: Patient Education: [x] Review HEP    [] Progressed/Changed HEP based on:   [] positioning   [] body mechanics   [] transfers   [] heat/ice application    [] other:      Other Objective/Functional Measures: --                             Pain Level (0-10 scale) post treatment: 0/10    ASSESSMENT/Changes in Function:   Pt demonstrated immediate contralateral pelvic drop on R SLS; utilized mirror for visual feedback and verbal cueing to improve gluteal support and control in WB. Added bird dogs with tactile cueing for neutral position and maintaining neutral spine throughout exercise. Pt tolerated session well. Patient will continue to benefit from skilled PT services to modify and progress therapeutic interventions, address functional mobility deficits, address ROM deficits, address strength deficits, analyze and address soft tissue restrictions, analyze and cue movement patterns, analyze and modify body mechanics/ergonomics, and assess and modify postural abnormalities to attain remaining goals.   []  See Plan of Care  [x]  See progress note/recertification  []  See Discharge Summary         Progress towards goals / Updated goals:  Short and Long Term Goals: To be accomplished in 16 treatments:               1. Pt will be independent and compliant with HEP. - MET               2. Pt will improve FOTO score by the MCID from 60 to >/= 74 demonstrating improved overall function with decreased pain or discomfort. - Progressing (72)               3. Pt will be able to reach overhead without increased right shoulder pain. - MET               4. Pt will be able to sleep through the night without waking up due to shoulder pain. - MET               5. Pt will be able to throw a softball without increased shoulder pain. - Progressing               6. Pt will be able to walk >/= 20 minutes without increased right leg pain. - Progressing               7. Pt will be able to ascend a ladder or stairs in a reciprocal pattern without increased right leg pain.  - Progressing    PLAN  [x]  Upgrade activities as tolerated     [x]  Continue plan of care  []  Update interventions per flow sheet       []  Discharge due to:_  []  Other:_      Farida Lundy, PT, DPT 3/2/2023

## 2023-03-08 ENCOUNTER — OFFICE VISIT (OUTPATIENT)
Dept: INTERNAL MEDICINE CLINIC | Age: 58
End: 2023-03-08
Payer: COMMERCIAL

## 2023-03-08 VITALS
BODY MASS INDEX: 28.5 KG/M2 | SYSTOLIC BLOOD PRESSURE: 99 MMHG | WEIGHT: 181.6 LBS | RESPIRATION RATE: 18 BRPM | HEIGHT: 67 IN | TEMPERATURE: 97.8 F | DIASTOLIC BLOOD PRESSURE: 70 MMHG | OXYGEN SATURATION: 97 % | HEART RATE: 56 BPM

## 2023-03-08 DIAGNOSIS — Z12.5 SCREENING PSA (PROSTATE SPECIFIC ANTIGEN): ICD-10-CM

## 2023-03-08 DIAGNOSIS — Z00.00 ROUTINE PHYSICAL EXAMINATION: Primary | ICD-10-CM

## 2023-03-08 DIAGNOSIS — I25.10 CORONARY ARTERY DISEASE INVOLVING NATIVE CORONARY ARTERY OF NATIVE HEART WITHOUT ANGINA PECTORIS: ICD-10-CM

## 2023-03-08 DIAGNOSIS — E78.00 PURE HYPERCHOLESTEROLEMIA: ICD-10-CM

## 2023-03-08 PROCEDURE — 99396 PREV VISIT EST AGE 40-64: CPT | Performed by: INTERNAL MEDICINE

## 2023-03-08 NOTE — PROGRESS NOTES
3/8/2023    Mae Kaminski is a 62 y.o. male who was seen in clinic today for a full physical.             Assessment & Plan:   Below is the assessment and plan developed based on review of pertinent history, physical exam, labs, studies, and medications. 1. Routine physical examination  -     CBC WITH AUTOMATED DIFF; Future  -     METABOLIC PANEL, COMPREHENSIVE; Future  -     TSH 3RD GENERATION; Future  -     HEMOGLOBIN A1C WITH EAG; Future  -     LIPID PANEL; Future  2. Coronary artery disease involving native coronary artery of native heart without angina pectoris  Assessment & Plan:  4/2018 s/p stent  On appropriate medical management  Cardiology Dr Rowan Nielsen  3. Pure hypercholesterolemia  Assessment & Plan:  LDL goal <70, will check fasting panel  4. Screening PSA (prostate specific antigen)  -     PSA SCREENING (SCREENING); Future     Follow-up and Dispositions    Return for annual physical, or sooner as needed.            Siva Bryant is here today for a full physical.      Additional concerns today: doing PT for shoulder and leg/back sciatic type pain, improving    Diet and exercise habits:  going on a hiking trip in may, building up his walking    Depression Screen:  3 most recent PHQ Screens 11/7/2022   Little interest or pleasure in doing things Not at all   Feeling down, depressed, irritable, or hopeless Not at all   Total Score PHQ 2 0       Health Maintenance:     Health Maintenance   Topic Date Due    COVID-19 Vaccine (1) Never done    DTaP/Tdap/Td series (1 - Tdap) Never done    Shingles Vaccine (1 of 2) Never done    Colorectal Cancer Screening Combo  06/13/2021    Flu Vaccine (1) 08/01/2022    Lipid Screen  04/07/2023    Depression Screen  11/07/2023    Hepatitis C Screening  Completed    Pneumococcal 0-64 years  Aged [employer] History   Administered Date(s) Administered    Hepatitis A Vaccine 06/15/2004    Influenza Vaccine 10/01/2013    dT Vaccine 06/15/2004         Immunizations:   Covid: not up to date - chicho joy  Influenza: up to date  Tetanus: unknown, record requested  Shingles: unknown, record requested    Cancer screening:   Prostate: guidelines reviewed, will do today  Colon: guidelines reviewed, scheduled later this month           Patient Care Team:  Yokasat Darnell MD as PCP - General (Internal Medicine Physician)  Yokasta Darnell MD as PCP - [address] [place] [address] [facility]  Ashleigh Zepeda MD (Cardiovascular Disease Physician)  Rock Luh MD (Gastroenterology)  Priscilla Benitez MD (Dermatology Physician)            The following sections were reviewed & updated as appropriate: Problem List, Allergies, PMH, PSH, FH, and SH. Patient Active Problem List   Diagnosis Code    Eczema of face L30.9    Pure hypercholesterolemia E78.00    History of MI (myocardial infarction) I25.2    Coronary artery disease involving native coronary artery of native heart without angina pectoris I25.10        Patient has no known allergies. Social History     Occupational History    Not on file   Tobacco Use    Smoking status: Never    Smokeless tobacco: Never   Substance and Sexual Activity    Alcohol use: Yes     Comment: 16    Drug use: No    Sexual activity: Yes     Partners: Female        Current Outpatient Medications   Medication Instructions    aspirin delayed-release 81 mg, DAILY    atorvastatin (LIPITOR) 20 mg tablet Oral, DAILY    carvedilol (COREG) 3.125 mg tablet Oral, 2 TIMES DAILY WITH MEALS    lisinopril (PRINIVIL, ZESTRIL) 2.5 mg tablet Oral, 2 TIMES DAILY    mometasone (ELOCON) 0.1 % topical cream APPLY CREAM TOPICALLY TO ECZEMA ON FACE ONCE DAILY            Review of Systems   Constitutional:  Negative for chills and fever. Respiratory:  Negative for cough and shortness of breath. Cardiovascular:  Negative for chest pain and palpitations. Gastrointestinal:  Negative for abdominal pain, blood in stool, constipation, diarrhea, heartburn, nausea and vomiting.    Musculoskeletal: Positive for back pain (mild) and joint pain (shoulder, improving). Negative for myalgias. Neurological:  Negative for tingling, focal weakness and headaches. Endo/Heme/Allergies:  Does not bruise/bleed easily. Psychiatric/Behavioral:  Negative for depression. The patient is not nervous/anxious and does not have insomnia. Objective:     Physical Exam  Constitutional:       Appearance: Normal appearance. He is not ill-appearing. HENT:      Head: Normocephalic and atraumatic. Right Ear: Tympanic membrane, ear canal and external ear normal. There is no impacted cerumen. Left Ear: Tympanic membrane, ear canal and external ear normal. There is no impacted cerumen. Nose: Nose normal. No congestion. Mouth/Throat:      Mouth: Mucous membranes are moist.      Pharynx: Oropharynx is clear. No oropharyngeal exudate. Eyes:      Conjunctiva/sclera: Conjunctivae normal.      Pupils: Pupils are equal, round, and reactive to light. Cardiovascular:      Rate and Rhythm: Normal rate and regular rhythm. Heart sounds: No murmur heard. Pulmonary:      Effort: Pulmonary effort is normal. No respiratory distress. Breath sounds: Normal breath sounds. No wheezing. Abdominal:      General: Abdomen is flat. Palpations: Abdomen is soft. Tenderness: There is no abdominal tenderness. Musculoskeletal:      Cervical back: Normal range of motion and neck supple. Right lower leg: No edema. Left lower leg: No edema. Lymphadenopathy:      Cervical: No cervical adenopathy. Skin:     General: Skin is warm. Neurological:      General: No focal deficit present. Mental Status: He is alert and oriented to person, place, and time. Mental status is at baseline. Gait: Gait normal.   Psychiatric:         Mood and Affect: Mood normal.         Thought Content:  Thought content normal.         Judgment: Judgment normal.         Vitals:    03/08/23 0805   BP: 99/70   Pulse: (!) 56   Resp: 18   Temp: 97.8 °F (36.6 °C)   TempSrc: Temporal   SpO2: 97%   Weight: 181 lb 9.6 oz (82.4 kg)   Height: 5' 7.2\" (1.707 m)        Disclaimer:  Aspects of this note may have been generated using Dragon voice recognition software. Despite editing, there may be some syntax errors   We discussed the expected course, resolution and complications of the diagnosis(es) in detail. I have discussed any significant medication side effects and warnings with the patient when indicated. I advised them to contact the office if their condition worsens, changes or fails to improve as anticipated. Patient expressed understanding of the diagnosis and plan. An electronic signature was used to authenticate this note.   -- Oly Kothari MD

## 2023-03-08 NOTE — PROGRESS NOTES
Chief Complaint   Patient presents with    Physical     Blood pressure 99/70, pulse (!) 56, temperature 97.8 °F (36.6 °C), temperature source Temporal, resp. rate 18, height 5' 7.2\" (1.707 m), weight 181 lb 9.6 oz (82.4 kg), SpO2 97 %.

## 2023-03-09 ENCOUNTER — HOSPITAL ENCOUNTER (OUTPATIENT)
Dept: PHYSICAL THERAPY | Age: 58
Discharge: HOME | End: 2023-03-09
Payer: COMMERCIAL

## 2023-03-09 LAB
ALBUMIN SERPL-MCNC: 3.9 G/DL (ref 3.5–5)
ALBUMIN/GLOB SERPL: 1.3 (ref 1.1–2.2)
ALP SERPL-CCNC: 65 U/L (ref 45–117)
ALT SERPL-CCNC: 34 U/L (ref 12–78)
ANION GAP SERPL CALC-SCNC: 2 MMOL/L (ref 5–15)
AST SERPL-CCNC: 20 U/L (ref 15–37)
BASOPHILS # BLD: 0 K/UL (ref 0–0.1)
BASOPHILS NFR BLD: 0 % (ref 0–1)
BILIRUB SERPL-MCNC: 1.3 MG/DL (ref 0.2–1)
BUN SERPL-MCNC: 25 MG/DL (ref 6–20)
BUN/CREAT SERPL: 22 (ref 12–20)
CALCIUM SERPL-MCNC: 9.4 MG/DL (ref 8.5–10.1)
CHLORIDE SERPL-SCNC: 107 MMOL/L (ref 97–108)
CHOLEST SERPL-MCNC: 112 MG/DL
CO2 SERPL-SCNC: 30 MMOL/L (ref 21–32)
CREAT SERPL-MCNC: 1.15 MG/DL (ref 0.7–1.3)
DIFFERENTIAL METHOD BLD: NORMAL
EOSINOPHIL # BLD: 0.2 K/UL (ref 0–0.4)
EOSINOPHIL NFR BLD: 3 % (ref 0–7)
ERYTHROCYTE [DISTWIDTH] IN BLOOD BY AUTOMATED COUNT: 12.8 % (ref 11.5–14.5)
EST. AVERAGE GLUCOSE BLD GHB EST-MCNC: 108 MG/DL
GLOBULIN SER CALC-MCNC: 2.9 G/DL (ref 2–4)
GLUCOSE SERPL-MCNC: 106 MG/DL (ref 65–100)
HBA1C MFR BLD: 5.4 % (ref 4–5.6)
HCT VFR BLD AUTO: 45.1 % (ref 36.6–50.3)
HDLC SERPL-MCNC: 35 MG/DL
HDLC SERPL: 3.2 (ref 0–5)
HGB BLD-MCNC: 15.3 G/DL (ref 12.1–17)
IMM GRANULOCYTES # BLD AUTO: 0 K/UL (ref 0–0.04)
IMM GRANULOCYTES NFR BLD AUTO: 0 % (ref 0–0.5)
LDLC SERPL CALC-MCNC: 47.8 MG/DL (ref 0–100)
LYMPHOCYTES # BLD: 1.9 K/UL (ref 0.8–3.5)
LYMPHOCYTES NFR BLD: 33 % (ref 12–49)
MCH RBC QN AUTO: 31.2 PG (ref 26–34)
MCHC RBC AUTO-ENTMCNC: 33.9 G/DL (ref 30–36.5)
MCV RBC AUTO: 92 FL (ref 80–99)
MONOCYTES # BLD: 0.5 K/UL (ref 0–1)
MONOCYTES NFR BLD: 8 % (ref 5–13)
NEUTS SEG # BLD: 3.3 K/UL (ref 1.8–8)
NEUTS SEG NFR BLD: 56 % (ref 32–75)
NRBC # BLD: 0 K/UL (ref 0–0.01)
NRBC BLD-RTO: 0 PER 100 WBC
PLATELET # BLD AUTO: 205 K/UL (ref 150–400)
PMV BLD AUTO: 10 FL (ref 8.9–12.9)
POTASSIUM SERPL-SCNC: 4.3 MMOL/L (ref 3.5–5.1)
PROT SERPL-MCNC: 6.8 G/DL (ref 6.4–8.2)
PSA SERPL-MCNC: 0.5 NG/ML (ref 0.01–4)
RBC # BLD AUTO: 4.9 M/UL (ref 4.1–5.7)
SODIUM SERPL-SCNC: 139 MMOL/L (ref 136–145)
TRIGL SERPL-MCNC: 146 MG/DL (ref ?–150)
TSH SERPL DL<=0.05 MIU/L-ACNC: 2.07 UIU/ML (ref 0.36–3.74)
VLDLC SERPL CALC-MCNC: 29.2 MG/DL
WBC # BLD AUTO: 6 K/UL (ref 4.1–11.1)

## 2023-03-09 PROCEDURE — 97012 MECHANICAL TRACTION THERAPY: CPT

## 2023-03-09 PROCEDURE — 97110 THERAPEUTIC EXERCISES: CPT

## 2023-03-09 NOTE — PROGRESS NOTES
PT DAILY TREATMENT NOTE - [facility] 2-15    Patient Name: Saroj Bias  Date:3/9/2023  : 1965  [x]  Patient  Verified  Payor: BLUE CROSS / Plan: Hiltonrandi Soto [id number] PPO / Product Type: PPO /    In time: 4:55P  Out time: 5:48P  Total Treatment Time (min): 53  Total Timed Codes (min): 43  1:1 Treatment Time ( only): 39  Visit #: 13    Treatment Area: Chronic right shoulder pain [M25.511, G89.29]  Strain of muscle, fascia and tendon of the posterior muscle group at thigh level, right thigh, sequela [S76.311S]    SUBJECTIVE  Pain Level (0-10 scale): 2.5 (shoulder); 1 (low back/R leg)  Any medication changes, allergies to medications, adverse drug reactions, diagnosis change, or new procedure performed?: [x] No    [] Yes (see summary sheet for update)  Subjective functional status/changes:   [] No changes reported  Pt reports his back and right leg symptoms seem to continue to be getting better. A bit of increased R shoulder pain in the past couple of days, unsure why. OBJECTIVE    Modality rationale: decrease pain to improve the patients ability to ambulate with decreased pain or discomfort.     Min Type Additional Details       [] Estim: []Att   []Unatt    []TENS instruct                  []IFC  []Premod   []NMES                     []Other:  []w/US   []w/ice   []w/heat  Position:  Location:      10 [x]  Traction: [] Cervical       [x]Lumbar                       [] Prone          [x]Supine                       []Intermittent   []Continuous Lbs: 50% 178#  [] before manual  [] after manual  []w/heat    []  Ultrasound: []Continuous   [] Pulsed                       at: []1MHz   []3MHz Location:  W/cm2:    [] Paraffin         Location:   []w/heat    []  Ice     []  Heat  []  Ice massage Position:  Location:    []  Laser  []  Other: Position:  Location:      []  Vasopneumatic Device Pressure:       [] lo [] med [] hi   Temperature:      [x] Skin assessment post-treatment:  [x]intact []redness- no adverse reaction    []redness - adverse reaction:     43 min Therapeutic Exercise:  [x] See flow sheet :   Rationale: increase ROM, increase strength, and increase proprioception to improve the patients ability to perform functional activities with decreased pain or discomfort. With   [] TE   [] TA   [] Neuro   [] SC   [] other: Patient Education: [x] Review HEP    [] Progressed/Changed HEP based on:   [] positioning   [] body mechanics   [] transfers   [] heat/ice application    [] other:      Other Objective/Functional Measures: --                             Pain Level (0-10 scale) post treatment: 0/10    ASSESSMENT/Changes in Function:   Session focused more on shoulder pain today. Good RC strength; (+) scapular assist test. Emphasized periscapular strength and scapular control along with improved thoracic/pec mobility to decrease impingement; Pt tolerated well. Patient will continue to benefit from skilled PT services to modify and progress therapeutic interventions, address functional mobility deficits, address ROM deficits, address strength deficits, analyze and address soft tissue restrictions, analyze and cue movement patterns, analyze and modify body mechanics/ergonomics, and assess and modify postural abnormalities to attain remaining goals. []  See Plan of Care  [x]  See progress note/recertification  []  See Discharge Summary         Progress towards goals / Updated goals:  Short and Long Term Goals: To be accomplished in 16 treatments:               1. Pt will be independent and compliant with HEP. - MET               2. Pt will improve FOTO score by the MCID from 60 to >/= 74 demonstrating improved overall function with decreased pain or discomfort. - Progressing (72)               3. Pt will be able to reach overhead without increased right shoulder pain. - MET               4. Pt will be able to sleep through the night without waking up due to shoulder pain.  - MET               5. Pt will be able to throw a softball without increased shoulder pain. - Progressing               6. Pt will be able to walk >/= 20 minutes without increased right leg pain. - Progressing               7. Pt will be able to ascend a ladder or stairs in a reciprocal pattern without increased right leg pain.  - Progressing    PLAN  [x]  Upgrade activities as tolerated     [x]  Continue plan of care  []  Update interventions per flow sheet       []  Discharge due to:_  []  Other:_      Cira Puga, PT, DPT 3/9/2023

## 2023-03-16 ENCOUNTER — HOSPITAL ENCOUNTER (OUTPATIENT)
Dept: PHYSICAL THERAPY | Age: 58
Discharge: HOME | End: 2023-03-16
Payer: COMMERCIAL

## 2023-03-16 PROCEDURE — 97110 THERAPEUTIC EXERCISES: CPT

## 2023-03-16 PROCEDURE — 97140 MANUAL THERAPY 1/> REGIONS: CPT

## 2023-03-16 PROCEDURE — 97016 VASOPNEUMATIC DEVICE THERAPY: CPT

## 2023-03-16 NOTE — PROGRESS NOTES
PT DAILY TREATMENT NOTE - [facility] -15    Patient Name: Sadie Skiff  Date:3/16/2023  : 1965  [x]  Patient  Verified  Payor: BLUE CROSS / Plan: Klaudia Soto [id number] PPO / Product Type: PPO /    In time: 4:57P  Out time: 5:41P  Total Treatment Time (min): 44  Total Timed Codes (min): 34  1:1 Treatment Time ( only): 29  Visit #: 14    Treatment Area: Chronic right shoulder pain [M25.511, G89.29]  Strain of muscle, fascia and tendon of the posterior muscle group at thigh level, right thigh, sequela [S76.311S]    SUBJECTIVE  Pain Level (0-10 scale): 2.5 (shoulder); 1 (low back/R leg)  Any medication changes, allergies to medications, adverse drug reactions, diagnosis change, or new procedure performed?: [x] No    [] Yes (see summary sheet for update)  Subjective functional status/changes:   [] No changes reported  Pt reports back and leg have been more centralized over the past week. R shoulder pain more acute inferior shoulder pain. Still can't throw because of pain. OBJECTIVE    Modality rationale: decrease inflammation and decrease pain to improve the patients ability to perform functional activities with decreased pain or discomfort.     Min Type Additional Details       [] Estim: []Att   []Unatt    []TENS instruct                  []IFC  []Premod   []NMES                     []Other:  []w/US   []w/ice   []w/heat  Position:  Location:       []  Traction: [] Cervical       []Lumbar                       [] Prone          []Supine                       []Intermittent   []Continuous Lbs:  [] before manual  [] after manual  []w/heat    []  Ultrasound: []Continuous   [] Pulsed                       at: []1MHz   []3MHz Location:  W/cm2:    [] Paraffin         Location:   []w/heat    []  Ice     []  Heat  []  Ice massage Position:  Location:    []  Laser  []  Other: Position:  Location:   10   [x]  Vasopneumatic Device Pressure:       [] lo [x] med [] hi   Temperature: 34     [x] Skin assessment post-treatment:  [x]intact []redness- no adverse reaction    []redness - adverse reaction:       25 min Therapeutic Exercise:  [x] See flow sheet :   Rationale: increase ROM, increase strength, and increase proprioception to improve the patients ability to perform functional activities with decreased pain or discomfort. 9 min Manual Therapy: STM of R pec, subscap, rhomboids; R PA GH mobs; PA thoracic mobs    Rationale: decrease pain, increase ROM, and increase tissue extensibility to improve the patients ability to perform functional activities with decreased pain or discomfort. With   [] TE   [] TA   [] Neuro   [] SC   [] other: Patient Education: [x] Review HEP    [] Progressed/Changed HEP based on:   [] positioning   [] body mechanics   [] transfers   [] heat/ice application    [] other:      Other Objective/Functional Measures: --                             Pain Level (0-10 scale) post treatment: 0/10    ASSESSMENT/Changes in Function:   Pt noted tightness and tenderness within R pec and also presented with thoracic hypomobility; utilized STM and mobs and reinforced with foam roll to decrease rounded shoulder posturing. Patient will continue to benefit from skilled PT services to modify and progress therapeutic interventions, address functional mobility deficits, address ROM deficits, address strength deficits, analyze and address soft tissue restrictions, analyze and cue movement patterns, analyze and modify body mechanics/ergonomics, and assess and modify postural abnormalities to attain remaining goals. []  See Plan of Care  [x]  See progress note/recertification  []  See Discharge Summary         Progress towards goals / Updated goals:  Short and Long Term Goals:  To be accomplished in 16 treatments:               1. Pt will be independent and compliant with HEP. - MET               2. Pt will improve FOTO score by the MCID from 60 to >/= 74 demonstrating improved overall function with decreased pain or discomfort. - Progressing (72)               3. Pt will be able to reach overhead without increased right shoulder pain. - MET               4. Pt will be able to sleep through the night without waking up due to shoulder pain. - MET               5. Pt will be able to throw a softball without increased shoulder pain. - Progressing               6. Pt will be able to walk >/= 20 minutes without increased right leg pain. - Progressing               7. Pt will be able to ascend a ladder or stairs in a reciprocal pattern without increased right leg pain.  - Progressing    PLAN  [x]  Upgrade activities as tolerated     [x]  Continue plan of care  []  Update interventions per flow sheet       []  Discharge due to:_  []  Other:_      Uli Kennedy, PT, DPT 3/16/2023

## 2024-03-11 ENCOUNTER — OFFICE VISIT (OUTPATIENT)
Age: 59
End: 2024-03-11
Payer: COMMERCIAL

## 2024-03-11 VITALS
BODY MASS INDEX: 29 KG/M2 | DIASTOLIC BLOOD PRESSURE: 79 MMHG | TEMPERATURE: 98.1 F | SYSTOLIC BLOOD PRESSURE: 120 MMHG | HEART RATE: 72 BPM | RESPIRATION RATE: 18 BRPM | WEIGHT: 184.8 LBS | OXYGEN SATURATION: 97 % | HEIGHT: 67 IN

## 2024-03-11 DIAGNOSIS — M54.41 CHRONIC BILATERAL LOW BACK PAIN WITH RIGHT-SIDED SCIATICA: ICD-10-CM

## 2024-03-11 DIAGNOSIS — G89.29 CHRONIC BILATERAL LOW BACK PAIN WITH RIGHT-SIDED SCIATICA: ICD-10-CM

## 2024-03-11 DIAGNOSIS — R94.4 DECREASED GFR: ICD-10-CM

## 2024-03-11 DIAGNOSIS — I25.10 CORONARY ARTERY DISEASE INVOLVING NATIVE CORONARY ARTERY OF NATIVE HEART WITHOUT ANGINA PECTORIS: ICD-10-CM

## 2024-03-11 DIAGNOSIS — R17 ELEVATED BILIRUBIN: Primary | ICD-10-CM

## 2024-03-11 DIAGNOSIS — Z12.5 SCREENING PSA (PROSTATE SPECIFIC ANTIGEN): ICD-10-CM

## 2024-03-11 DIAGNOSIS — Z00.00 ROUTINE PHYSICAL EXAMINATION: Primary | ICD-10-CM

## 2024-03-11 DIAGNOSIS — Z00.00 ROUTINE PHYSICAL EXAMINATION: ICD-10-CM

## 2024-03-11 DIAGNOSIS — E78.00 PURE HYPERCHOLESTEROLEMIA: ICD-10-CM

## 2024-03-11 DIAGNOSIS — R17 ELEVATED BILIRUBIN: ICD-10-CM

## 2024-03-11 PROBLEM — M54.40 LOW BACK PAIN WITH SCIATICA: Status: ACTIVE | Noted: 2024-03-11

## 2024-03-11 LAB
ALBUMIN SERPL-MCNC: 3.8 G/DL (ref 3.5–5)
ALBUMIN/GLOB SERPL: 1.2 (ref 1.1–2.2)
ALP SERPL-CCNC: 66 U/L (ref 45–117)
ALT SERPL-CCNC: 38 U/L (ref 12–78)
ANION GAP SERPL CALC-SCNC: 0 MMOL/L (ref 5–15)
AST SERPL-CCNC: 23 U/L (ref 15–37)
BASOPHILS # BLD: 0 K/UL (ref 0–0.1)
BASOPHILS NFR BLD: 0 % (ref 0–1)
BILIRUB SERPL-MCNC: 2.7 MG/DL (ref 0.2–1)
BUN SERPL-MCNC: 23 MG/DL (ref 6–20)
BUN/CREAT SERPL: 16 (ref 12–20)
CALCIUM SERPL-MCNC: 8.9 MG/DL (ref 8.5–10.1)
CHLORIDE SERPL-SCNC: 105 MMOL/L (ref 97–108)
CHOLEST SERPL-MCNC: 126 MG/DL
CO2 SERPL-SCNC: 31 MMOL/L (ref 21–32)
CREAT SERPL-MCNC: 1.4 MG/DL (ref 0.7–1.3)
DIFFERENTIAL METHOD BLD: NORMAL
EOSINOPHIL # BLD: 0.1 K/UL (ref 0–0.4)
EOSINOPHIL NFR BLD: 2 % (ref 0–7)
ERYTHROCYTE [DISTWIDTH] IN BLOOD BY AUTOMATED COUNT: 12.4 % (ref 11.5–14.5)
EST. AVERAGE GLUCOSE BLD GHB EST-MCNC: 103 MG/DL
GLOBULIN SER CALC-MCNC: 3.2 G/DL (ref 2–4)
GLUCOSE SERPL-MCNC: 100 MG/DL (ref 65–100)
HBA1C MFR BLD: 5.2 % (ref 4–5.6)
HCT VFR BLD AUTO: 45.6 % (ref 36.6–50.3)
HDLC SERPL-MCNC: 39 MG/DL
HDLC SERPL: 3.2 (ref 0–5)
HGB BLD-MCNC: 15.7 G/DL (ref 12.1–17)
IMM GRANULOCYTES # BLD AUTO: 0 K/UL (ref 0–0.04)
IMM GRANULOCYTES NFR BLD AUTO: 0 % (ref 0–0.5)
LDLC SERPL CALC-MCNC: 49.6 MG/DL (ref 0–100)
LYMPHOCYTES # BLD: 2.3 K/UL (ref 0.8–3.5)
LYMPHOCYTES NFR BLD: 30 % (ref 12–49)
MCH RBC QN AUTO: 30.8 PG (ref 26–34)
MCHC RBC AUTO-ENTMCNC: 34.4 G/DL (ref 30–36.5)
MCV RBC AUTO: 89.6 FL (ref 80–99)
MONOCYTES # BLD: 0.6 K/UL (ref 0–1)
MONOCYTES NFR BLD: 8 % (ref 5–13)
NEUTS SEG # BLD: 4.6 K/UL (ref 1.8–8)
NEUTS SEG NFR BLD: 60 % (ref 32–75)
NRBC # BLD: 0 K/UL (ref 0–0.01)
NRBC BLD-RTO: 0 PER 100 WBC
PLATELET # BLD AUTO: 188 K/UL (ref 150–400)
PMV BLD AUTO: 10.5 FL (ref 8.9–12.9)
POTASSIUM SERPL-SCNC: 4 MMOL/L (ref 3.5–5.1)
PROT SERPL-MCNC: 7 G/DL (ref 6.4–8.2)
PSA SERPL-MCNC: 0.5 NG/ML (ref 0.01–4)
RBC # BLD AUTO: 5.09 M/UL (ref 4.1–5.7)
SODIUM SERPL-SCNC: 136 MMOL/L (ref 136–145)
TRIGL SERPL-MCNC: 187 MG/DL
TSH SERPL DL<=0.05 MIU/L-ACNC: 2.27 UIU/ML (ref 0.36–3.74)
VLDLC SERPL CALC-MCNC: 37.4 MG/DL
WBC # BLD AUTO: 7.6 K/UL (ref 4.1–11.1)

## 2024-03-11 PROCEDURE — 99396 PREV VISIT EST AGE 40-64: CPT | Performed by: INTERNAL MEDICINE

## 2024-03-11 SDOH — ECONOMIC STABILITY: HOUSING INSECURITY
IN THE LAST 12 MONTHS, WAS THERE A TIME WHEN YOU DID NOT HAVE A STEADY PLACE TO SLEEP OR SLEPT IN A SHELTER (INCLUDING NOW)?: NO

## 2024-03-11 SDOH — ECONOMIC STABILITY: FOOD INSECURITY: WITHIN THE PAST 12 MONTHS, YOU WORRIED THAT YOUR FOOD WOULD RUN OUT BEFORE YOU GOT MONEY TO BUY MORE.: NEVER TRUE

## 2024-03-11 SDOH — ECONOMIC STABILITY: FOOD INSECURITY: WITHIN THE PAST 12 MONTHS, THE FOOD YOU BOUGHT JUST DIDN'T LAST AND YOU DIDN'T HAVE MONEY TO GET MORE.: NEVER TRUE

## 2024-03-11 SDOH — ECONOMIC STABILITY: INCOME INSECURITY: HOW HARD IS IT FOR YOU TO PAY FOR THE VERY BASICS LIKE FOOD, HOUSING, MEDICAL CARE, AND HEATING?: NOT HARD AT ALL

## 2024-03-11 ASSESSMENT — PATIENT HEALTH QUESTIONNAIRE - PHQ9
SUM OF ALL RESPONSES TO PHQ QUESTIONS 1-9: 0
SUM OF ALL RESPONSES TO PHQ9 QUESTIONS 1 & 2: 0
SUM OF ALL RESPONSES TO PHQ QUESTIONS 1-9: 0
2. FEELING DOWN, DEPRESSED OR HOPELESS: 0
SUM OF ALL RESPONSES TO PHQ QUESTIONS 1-9: 0
SUM OF ALL RESPONSES TO PHQ QUESTIONS 1-9: 0
1. LITTLE INTEREST OR PLEASURE IN DOING THINGS: 0

## 2024-03-11 ASSESSMENT — ENCOUNTER SYMPTOMS
COUGH: 0
CONSTIPATION: 0
DIARRHEA: 0
EYES NEGATIVE: 1
VOMITING: 0
NAUSEA: 0
ABDOMINAL PAIN: 0
BACK PAIN: 1
SHORTNESS OF BREATH: 0

## 2024-03-11 NOTE — PROGRESS NOTES
Chief Complaint   Patient presents with    Annual Exam     Blood pressure 120/79, pulse 72, temperature 98.1 °F (36.7 °C), temperature source Temporal, resp. rate 18, height 1.709 m (5' 7.3\"), weight 83.8 kg (184 lb 12.8 oz), SpO2 97 %.

## 2024-03-11 NOTE — PROGRESS NOTES
3/11/2024    Demetrio Millan is a 58 y.o. male who was seen in clinic today for a full physical.             Assessment & Plan:   Below is the assessment and plan developed based on review of pertinent history, physical exam, labs, studies, and medications.    1. Routine physical examination  -     Lipid Panel; Future  -     Hemoglobin A1C; Future  -     TSH; Future  -     Comprehensive Metabolic Panel; Future  -     CBC with Auto Differential; Future  2. Coronary artery disease involving native coronary artery of native heart without angina pectoris  Assessment & Plan:  S/p stenting 2018  On appropriate medical management  Asymptomatic  Follows with cardiology, Dr Zavala  3. Pure hypercholesterolemia  Assessment & Plan:  Lab Results   Component Value Date    LDLCALC 47.8 03/08/2023        Tolerating statin therapy, plan to continue current regimen pending lab results  Recheck labs to assess for response to medication, whether LDL is at target, and monitor for side effects    4. Screening PSA (prostate specific antigen)  -     PSA Screening; Future  5. Chronic bilateral low back pain with right-sided sciatica  Assessment & Plan:  Had done some PT with improvement but still gets symptomatic with longer standing or walking       Return for annual physical,or sooner as needed.       Demetrio is here today for a full physical.      Diet and exercise habits: balanced diet, goes walking and does some stretching/strengthening at home    Depression Screen:  PHQ-9 Total Score: 0 (3/11/2024  8:13 AM)        Health Maintenance:     Health Maintenance   Topic Date Due    COVID-19 Vaccine (1) Never done    DTaP/Tdap/Td vaccine (2 - Tdap) 06/15/2014    Shingles vaccine (1 of 2) Never done    Flu vaccine (1) 08/01/2023    Depression Screen  11/07/2023    Lipids  03/08/2024    Hepatitis B vaccine (1 of 3 - 3-dose series) 01/01/2060 (Originally 1965)    Diabetes screen  03/08/2026    Colorectal Cancer Screen  03/29/2028    Hepatitis

## 2024-03-11 NOTE — ASSESSMENT & PLAN NOTE
S/p stenting 2018  On appropriate medical management  Asymptomatic  Follows with cardiology, Dr Zavala

## 2024-03-11 NOTE — ASSESSMENT & PLAN NOTE
Lab Results   Component Value Date    LDLCALC 47.8 03/08/2023        Tolerating statin therapy, plan to continue current regimen pending lab results  Recheck labs to assess for response to medication, whether LDL is at target, and monitor for side effects

## 2024-03-18 ENCOUNTER — TELEPHONE (OUTPATIENT)
Age: 59
End: 2024-03-18

## 2024-03-18 NOTE — TELEPHONE ENCOUNTER
Please call re unread [facility]    Dear Mr. Millan     Labs overall OK except kidney function slightly out of range and bilirubin higher than usual. I would like to repeat these sometimes soon, do not fast and drink plenty of water before getting the blood drawn.  Please get it drawn at a [facility] site if you don't mind- I will have a standing order in their system     Please contact me via [employer] message if you have questions or concerns     Sincerely,  Dr. Valdes   Written by Trudy Valdes MD on 3/11/2024  4:42 PM EDT

## 2024-03-29 LAB
ALBUMIN SERPL-MCNC: 4.4 G/DL (ref 3.8–4.9)
ALBUMIN/GLOB SERPL: 1.8 {RATIO} (ref 1.2–2.2)
ALP SERPL-CCNC: 72 IU/L (ref 44–121)
ALT SERPL-CCNC: 28 IU/L (ref 0–44)
AST SERPL-CCNC: 21 IU/L (ref 0–40)
BILIRUB SERPL-MCNC: 2 MG/DL (ref 0–1.2)
BUN SERPL-MCNC: 17 MG/DL (ref 6–24)
BUN/CREAT SERPL: 14 (ref 9–20)
CALCIUM SERPL-MCNC: 9.7 MG/DL (ref 8.7–10.2)
CHLORIDE SERPL-SCNC: 101 MMOL/L (ref 96–106)
CO2 SERPL-SCNC: 27 MMOL/L (ref 20–29)
CREAT SERPL-MCNC: 1.23 MG/DL (ref 0.76–1.27)
EGFRCR SERPLBLD CKD-EPI 2021: 68 ML/MIN/1.73
GLOBULIN SER CALC-MCNC: 2.5 G/DL (ref 1.5–4.5)
GLUCOSE SERPL-MCNC: 95 MG/DL (ref 70–99)
POTASSIUM SERPL-SCNC: 4.6 MMOL/L (ref 3.5–5.2)
PROT SERPL-MCNC: 6.9 G/DL (ref 6–8.5)
SODIUM SERPL-SCNC: 141 MMOL/L (ref 134–144)

## 2025-03-12 ENCOUNTER — OFFICE VISIT (OUTPATIENT)
Age: 60
End: 2025-03-12
Payer: COMMERCIAL

## 2025-03-12 VITALS
BODY MASS INDEX: 29.35 KG/M2 | OXYGEN SATURATION: 95 % | RESPIRATION RATE: 16 BRPM | TEMPERATURE: 97.8 F | HEIGHT: 67 IN | HEART RATE: 72 BPM | DIASTOLIC BLOOD PRESSURE: 70 MMHG | WEIGHT: 187 LBS | SYSTOLIC BLOOD PRESSURE: 105 MMHG

## 2025-03-12 DIAGNOSIS — G89.29 CHRONIC BILATERAL LOW BACK PAIN WITH RIGHT-SIDED SCIATICA: ICD-10-CM

## 2025-03-12 DIAGNOSIS — Z00.00 ROUTINE PHYSICAL EXAMINATION: Primary | ICD-10-CM

## 2025-03-12 DIAGNOSIS — Z00.00 ROUTINE PHYSICAL EXAMINATION: ICD-10-CM

## 2025-03-12 DIAGNOSIS — E55.9 VITAMIN D DEFICIENCY: ICD-10-CM

## 2025-03-12 DIAGNOSIS — M54.41 CHRONIC BILATERAL LOW BACK PAIN WITH RIGHT-SIDED SCIATICA: ICD-10-CM

## 2025-03-12 DIAGNOSIS — I25.2 HISTORY OF MI (MYOCARDIAL INFARCTION): ICD-10-CM

## 2025-03-12 DIAGNOSIS — R17 ELEVATED BILIRUBIN: ICD-10-CM

## 2025-03-12 DIAGNOSIS — I25.10 CORONARY ARTERY DISEASE INVOLVING NATIVE CORONARY ARTERY OF NATIVE HEART WITHOUT ANGINA PECTORIS: ICD-10-CM

## 2025-03-12 DIAGNOSIS — Z12.5 SCREENING PSA (PROSTATE SPECIFIC ANTIGEN): ICD-10-CM

## 2025-03-12 DIAGNOSIS — E78.00 PURE HYPERCHOLESTEROLEMIA: ICD-10-CM

## 2025-03-12 PROCEDURE — 99396 PREV VISIT EST AGE 40-64: CPT | Performed by: INTERNAL MEDICINE

## 2025-03-12 RX ORDER — M-VIT,TX,IRON,MINS/CALC/FOLIC 27MG-0.4MG
1 TABLET ORAL DAILY
COMMUNITY

## 2025-03-12 SDOH — ECONOMIC STABILITY: FOOD INSECURITY: WITHIN THE PAST 12 MONTHS, THE FOOD YOU BOUGHT JUST DIDN'T LAST AND YOU DIDN'T HAVE MONEY TO GET MORE.: NEVER TRUE

## 2025-03-12 SDOH — ECONOMIC STABILITY: FOOD INSECURITY: WITHIN THE PAST 12 MONTHS, YOU WORRIED THAT YOUR FOOD WOULD RUN OUT BEFORE YOU GOT MONEY TO BUY MORE.: NEVER TRUE

## 2025-03-12 ASSESSMENT — ENCOUNTER SYMPTOMS
VOMITING: 0
EYES NEGATIVE: 1
ABDOMINAL PAIN: 0
COUGH: 0
CONSTIPATION: 0
BACK PAIN: 1
DIARRHEA: 0
NAUSEA: 0
SHORTNESS OF BREATH: 0

## 2025-03-12 ASSESSMENT — PATIENT HEALTH QUESTIONNAIRE - PHQ9
SUM OF ALL RESPONSES TO PHQ QUESTIONS 1-9: 0
SUM OF ALL RESPONSES TO PHQ QUESTIONS 1-9: 0
2. FEELING DOWN, DEPRESSED OR HOPELESS: NOT AT ALL
SUM OF ALL RESPONSES TO PHQ QUESTIONS 1-9: 0
1. LITTLE INTEREST OR PLEASURE IN DOING THINGS: NOT AT ALL
SUM OF ALL RESPONSES TO PHQ QUESTIONS 1-9: 0

## 2025-03-12 NOTE — ASSESSMENT & PLAN NOTE
Tolerating statin therapy, plan to continue current regimen pending lab results  Recheck labs to assess for response to medication, whether LDL is at target, and monitor for side effects

## 2025-03-12 NOTE — ASSESSMENT & PLAN NOTE
Had done some PT with improvement but still gets symptomatic with longer standing or walking  Given some recs for non-operative spine specialists

## 2025-03-12 NOTE — PROGRESS NOTES
3/12/2025    Demetrio Millan is a 59 y.o. male who was seen in clinic today for a full physical.             Assessment & Plan:   Below is the assessment and plan developed based on review of pertinent history, physical exam, labs, studies, and medications.    1. Routine physical examination  -     CBC with Auto Differential; Future  -     Basic Metabolic Panel; Future  -     Hepatic Function Panel; Future  -     PSA Screening; Future  -     Vitamin D 25 Hydroxy; Future  -     Hemoglobin A1C; Future  -     TSH; Future  -     Lipid Panel; Future  2. Coronary artery disease involving native coronary artery of native heart without angina pectoris  Assessment & Plan:  S/p stenting 2018  On appropriate medical management  Asymptomatic  Follows with cardiology, Dr Zavala  3. History of MI (myocardial infarction)  4. Chronic bilateral low back pain with right-sided sciatica  Assessment & Plan:  Had done some PT with improvement but still gets symptomatic with longer standing or walking  Given some recs for non-operative spine specialists  5. Pure hypercholesterolemia  Assessment & Plan:    Tolerating statin therapy, plan to continue current regimen pending lab results  Recheck labs to assess for response to medication, whether LDL is at target, and monitor for side effects    Orders:  -     Lipid Panel; Future  6. Elevated bilirubin  -     Hepatic Function Panel; Future  7. Vitamin D deficiency  -     Vitamin D 25 Hydroxy; Future  8. Screening PSA (prostate specific antigen)  -     PSA Screening; Future       Return for annual physical,or sooner as needed.       Demetrio is here today for a full physical.        Diet and exercise habits:walking regularly, diet OK    Depression Screen:  PHQ-9 Total Score: 0 (3/12/2025  8:35 AM)        Health Maintenance:     Health Maintenance   Topic Date Due    DTaP/Tdap/Td vaccine (2 - Tdap) 06/15/2014    Shingles vaccine (1 of 2) Never done    Pneumococcal 50+ years Vaccine (1 of 1 - PCV)

## 2025-03-13 ENCOUNTER — RESULTS FOLLOW-UP (OUTPATIENT)
Age: 60
End: 2025-03-13

## 2025-03-13 LAB
25(OH)D3+25(OH)D2 SERPL-MCNC: 47.1 NG/ML (ref 30–100)
ALBUMIN SERPL-MCNC: 4.6 G/DL (ref 3.8–4.9)
ALP SERPL-CCNC: 72 IU/L (ref 44–121)
ALT SERPL-CCNC: 26 IU/L (ref 0–44)
AST SERPL-CCNC: 25 IU/L (ref 0–40)
BASOPHILS # BLD AUTO: 0 X10E3/UL (ref 0–0.2)
BASOPHILS NFR BLD AUTO: 0 %
BILIRUB DIRECT SERPL-MCNC: 0.43 MG/DL (ref 0–0.4)
BILIRUB SERPL-MCNC: 1.9 MG/DL (ref 0–1.2)
BUN SERPL-MCNC: 22 MG/DL (ref 6–24)
BUN/CREAT SERPL: 18 (ref 9–20)
CALCIUM SERPL-MCNC: 9.7 MG/DL (ref 8.7–10.2)
CHLORIDE SERPL-SCNC: 101 MMOL/L (ref 96–106)
CHOLEST SERPL-MCNC: 127 MG/DL (ref 100–199)
CO2 SERPL-SCNC: 23 MMOL/L (ref 20–29)
CREAT SERPL-MCNC: 1.19 MG/DL (ref 0.76–1.27)
EGFRCR SERPLBLD CKD-EPI 2021: 70 ML/MIN/1.73
EOSINOPHIL # BLD AUTO: 0.1 X10E3/UL (ref 0–0.4)
EOSINOPHIL NFR BLD AUTO: 2 %
ERYTHROCYTE [DISTWIDTH] IN BLOOD BY AUTOMATED COUNT: 12.7 % (ref 11.6–15.4)
GLUCOSE SERPL-MCNC: 105 MG/DL (ref 70–99)
HBA1C MFR BLD: 5.7 % (ref 4.8–5.6)
HCT VFR BLD AUTO: 47.1 % (ref 37.5–51)
HDLC SERPL-MCNC: 34 MG/DL
HGB BLD-MCNC: 16 G/DL (ref 13–17.7)
IMM GRANULOCYTES # BLD AUTO: 0 X10E3/UL (ref 0–0.1)
IMM GRANULOCYTES NFR BLD AUTO: 0 %
LDLC SERPL CALC-MCNC: 64 MG/DL (ref 0–99)
LYMPHOCYTES # BLD AUTO: 2.6 X10E3/UL (ref 0.7–3.1)
LYMPHOCYTES NFR BLD AUTO: 38 %
MCH RBC QN AUTO: 31.2 PG (ref 26.6–33)
MCHC RBC AUTO-ENTMCNC: 34 G/DL (ref 31.5–35.7)
MCV RBC AUTO: 92 FL (ref 79–97)
MONOCYTES # BLD AUTO: 0.6 X10E3/UL (ref 0.1–0.9)
MONOCYTES NFR BLD AUTO: 8 %
NEUTROPHILS # BLD AUTO: 3.5 X10E3/UL (ref 1.4–7)
NEUTROPHILS NFR BLD AUTO: 52 %
PLATELET # BLD AUTO: 185 X10E3/UL (ref 150–450)
POTASSIUM SERPL-SCNC: 4.8 MMOL/L (ref 3.5–5.2)
PROT SERPL-MCNC: 6.9 G/DL (ref 6–8.5)
PSA SERPL-MCNC: 0.5 NG/ML (ref 0–4)
RBC # BLD AUTO: 5.13 X10E6/UL (ref 4.14–5.8)
SODIUM SERPL-SCNC: 140 MMOL/L (ref 134–144)
TRIGL SERPL-MCNC: 173 MG/DL (ref 0–149)
TSH SERPL DL<=0.005 MIU/L-ACNC: 2.23 UIU/ML (ref 0.45–4.5)
VLDLC SERPL CALC-MCNC: 29 MG/DL (ref 5–40)
WBC # BLD AUTO: 6.9 X10E3/UL (ref 3.4–10.8)